# Patient Record
Sex: MALE | Race: WHITE | NOT HISPANIC OR LATINO | Employment: OTHER | URBAN - NONMETROPOLITAN AREA
[De-identification: names, ages, dates, MRNs, and addresses within clinical notes are randomized per-mention and may not be internally consistent; named-entity substitution may affect disease eponyms.]

---

## 2018-06-10 ENCOUNTER — HOSPITAL ENCOUNTER (INPATIENT)
Facility: HOSPITAL | Age: 83
LOS: 5 days | Discharge: NURSING, REHAB OR LONG-TERM CARE | End: 2018-06-15
Attending: FAMILY MEDICINE | Admitting: INTERNAL MEDICINE

## 2018-06-10 DIAGNOSIS — Z74.09 DECREASED STRENGTH, ENDURANCE, AND MOBILITY: ICD-10-CM

## 2018-06-10 DIAGNOSIS — R68.89 DECREASED STRENGTH, ENDURANCE, AND MOBILITY: ICD-10-CM

## 2018-06-10 DIAGNOSIS — R53.1 DECREASED STRENGTH, ENDURANCE, AND MOBILITY: ICD-10-CM

## 2018-06-10 DIAGNOSIS — Z74.09 IMPAIRED MOBILITY AND ADLS: ICD-10-CM

## 2018-06-10 DIAGNOSIS — R13.10 DYSPHAGIA, UNSPECIFIED TYPE: Primary | ICD-10-CM

## 2018-06-10 DIAGNOSIS — Z78.9 IMPAIRED MOBILITY AND ADLS: ICD-10-CM

## 2018-06-10 PROCEDURE — 25010000002 NALOXONE PER 1 MG

## 2018-06-10 RX ORDER — ACETAMINOPHEN 500 MG
500 TABLET ORAL EVERY 4 HOURS PRN
COMMUNITY

## 2018-06-10 RX ORDER — NALOXONE HYDROCHLORIDE 0.4 MG/ML
INJECTION, SOLUTION INTRAMUSCULAR; INTRAVENOUS; SUBCUTANEOUS
Status: COMPLETED
Start: 2018-06-10 | End: 2018-06-10

## 2018-06-10 RX ORDER — CLONIDINE HYDROCHLORIDE 0.1 MG/1
0.1 TABLET ORAL EVERY 4 HOURS PRN
COMMUNITY
End: 2018-06-15 | Stop reason: HOSPADM

## 2018-06-10 RX ORDER — CLONIDINE HYDROCHLORIDE 0.1 MG/1
0.1 TABLET ORAL NIGHTLY
COMMUNITY

## 2018-06-10 RX ORDER — AMMONIA INHALANTS 0.04 G/.3ML
1 INHALANT RESPIRATORY (INHALATION) ONCE
Status: COMPLETED | OUTPATIENT
Start: 2018-06-11 | End: 2018-06-10

## 2018-06-10 RX ORDER — METOPROLOL TARTRATE 50 MG/1
50 TABLET, FILM COATED ORAL 2 TIMES DAILY
COMMUNITY
End: 2018-06-15 | Stop reason: HOSPADM

## 2018-06-10 RX ORDER — LEVOTHYROXINE SODIUM 0.05 MG/1
50 TABLET ORAL DAILY
COMMUNITY

## 2018-06-10 RX ORDER — OXYBUTYNIN CHLORIDE 5 MG/1
5 TABLET ORAL DAILY
COMMUNITY
End: 2018-06-15 | Stop reason: HOSPADM

## 2018-06-10 RX ORDER — AMLODIPINE BESYLATE 5 MG/1
5 TABLET ORAL DAILY
COMMUNITY

## 2018-06-10 RX ORDER — ASPIRIN 81 MG/1
81 TABLET ORAL DAILY
COMMUNITY

## 2018-06-10 RX ORDER — TAMSULOSIN HYDROCHLORIDE 0.4 MG/1
1 CAPSULE ORAL NIGHTLY
COMMUNITY

## 2018-06-10 RX ORDER — TRAZODONE HYDROCHLORIDE 100 MG/1
100 TABLET ORAL NIGHTLY
COMMUNITY
End: 2018-06-15 | Stop reason: HOSPADM

## 2018-06-10 RX ORDER — OMEPRAZOLE 20 MG/1
20 CAPSULE, DELAYED RELEASE ORAL DAILY
COMMUNITY

## 2018-06-10 RX ORDER — DONEPEZIL HYDROCHLORIDE 10 MG/1
10 TABLET, FILM COATED ORAL NIGHTLY
COMMUNITY

## 2018-06-10 RX ORDER — VENLAFAXINE 37.5 MG/1
37.5 TABLET ORAL DAILY
COMMUNITY
End: 2018-06-15 | Stop reason: HOSPADM

## 2018-06-10 RX ORDER — RISPERIDONE 0.25 MG/1
0.25 TABLET ORAL 2 TIMES DAILY
COMMUNITY
End: 2018-06-15 | Stop reason: HOSPADM

## 2018-06-10 RX ADMIN — NALOXONE HYDROCHLORIDE 0.4 MG: 0.4 INJECTION, SOLUTION INTRAMUSCULAR; INTRAVENOUS; SUBCUTANEOUS at 23:44

## 2018-06-10 RX ADMIN — NALOXONE HYDROCHLORIDE 0.4 MG: 0.4 INJECTION, SOLUTION INTRAMUSCULAR; INTRAVENOUS; SUBCUTANEOUS at 23:40

## 2018-06-10 RX ADMIN — AMMONIA INHALANTS 1 EACH: 0.04 INHALANT RESPIRATORY (INHALATION) at 23:40

## 2018-06-11 ENCOUNTER — APPOINTMENT (OUTPATIENT)
Dept: NEUROLOGY | Facility: HOSPITAL | Age: 83
End: 2018-06-11
Attending: FAMILY MEDICINE

## 2018-06-11 ENCOUNTER — APPOINTMENT (OUTPATIENT)
Dept: CARDIOLOGY | Facility: HOSPITAL | Age: 83
End: 2018-06-11
Attending: FAMILY MEDICINE

## 2018-06-11 ENCOUNTER — APPOINTMENT (OUTPATIENT)
Dept: ULTRASOUND IMAGING | Facility: HOSPITAL | Age: 83
End: 2018-06-11

## 2018-06-11 PROBLEM — R41.89 UNRESPONSIVENESS: Status: ACTIVE | Noted: 2018-06-11

## 2018-06-11 LAB
ALBUMIN SERPL-MCNC: 3.5 G/DL (ref 3.5–5)
ALBUMIN/GLOB SERPL: 1.3 G/DL (ref 1.1–2.5)
ALP SERPL-CCNC: 72 U/L (ref 24–120)
ALT SERPL W P-5'-P-CCNC: 26 U/L (ref 0–54)
AMMONIA BLD-SCNC: <9 UMOL/L (ref 9–33)
ANION GAP SERPL CALCULATED.3IONS-SCNC: 8 MMOL/L (ref 4–13)
ARTERIAL PATENCY WRIST A: POSITIVE
AST SERPL-CCNC: 21 U/L (ref 7–45)
ATMOSPHERIC PRESS: 746 MMHG
BACTERIA UR QL AUTO: ABNORMAL /HPF
BASE EXCESS BLDA CALC-SCNC: 0.7 MMOL/L (ref 0–2)
BASOPHILS # BLD AUTO: 0.04 10*3/MM3 (ref 0–0.2)
BASOPHILS NFR BLD AUTO: 0.4 % (ref 0–2)
BDY SITE: ABNORMAL
BH CV ECHO MEAS - AO MAX PG: 8 MMHG
BH CV ECHO MEAS - AO ROOT AREA (BSA CORRECTED): 1.6
BH CV ECHO MEAS - AO ROOT AREA: 9.1 CM^2
BH CV ECHO MEAS - AO ROOT DIAM: 3.4 CM
BH CV ECHO MEAS - AO V2 MAX: 141 CM/SEC
BH CV ECHO MEAS - BSA(HAYCOCK): 2.1 M^2
BH CV ECHO MEAS - BSA: 2.1 M^2
BH CV ECHO MEAS - BZI_BMI: 26.3 KILOGRAMS/M^2
BH CV ECHO MEAS - BZI_METRIC_HEIGHT: 182.9 CM
BH CV ECHO MEAS - BZI_METRIC_WEIGHT: 88 KG
BH CV ECHO MEAS - CONTRAST EF 4CH: 50.8 ML/M^2
BH CV ECHO MEAS - EDV(CUBED): 33.1 ML
BH CV ECHO MEAS - EDV(MOD-SP4): 66.3 ML
BH CV ECHO MEAS - EDV(TEICH): 41.3 ML
BH CV ECHO MEAS - EF(MOD-SP4): 50.8 %
BH CV ECHO MEAS - ESV(MOD-SP4): 32.6 ML
BH CV ECHO MEAS - IVS/LVPW: 1.2
BH CV ECHO MEAS - LA DIMENSION: 3.9 CM
BH CV ECHO MEAS - LA/AO: 1.1
BH CV ECHO MEAS - LV DIASTOLIC VOL/BSA (35-75): 31.5 ML/M^2
BH CV ECHO MEAS - LV MASS(C)D: 233.5 GRAMS
BH CV ECHO MEAS - LV MASS(C)DI: 111 GRAMS/M^2
BH CV ECHO MEAS - LV SYSTOLIC VOL/BSA (12-30): 15.5 ML/M^2
BH CV ECHO MEAS - LVIDD: 3.2 CM
BH CV ECHO MEAS - LVLD AP4: 6.4 CM
BH CV ECHO MEAS - LVLS AP4: 6.1 CM
BH CV ECHO MEAS - LVOT AREA (M): 4.9 CM^2
BH CV ECHO MEAS - LVOT AREA: 4.9 CM^2
BH CV ECHO MEAS - LVOT DIAM: 2.5 CM
BH CV ECHO MEAS - MV A MAX VEL: 98 CM/SEC
BH CV ECHO MEAS - MV DEC TIME: 0.27 SEC
BH CV ECHO MEAS - MV E MAX VEL: 46.1 CM/SEC
BH CV ECHO MEAS - MV E/A: 0.47
BH CV ECHO MEAS - SI(MOD-SP4): 16 ML/M^2
BH CV ECHO MEAS - SV(MOD-SP4): 33.7 ML
BILIRUB SERPL-MCNC: 0.5 MG/DL (ref 0.1–1)
BILIRUB UR QL STRIP: NEGATIVE
BODY TEMPERATURE: 37 C
BUN BLD-MCNC: 16 MG/DL (ref 5–21)
BUN/CREAT SERPL: 17.2 (ref 7–25)
CA-I BLD-MCNC: 4.04 MG/DL (ref 4.6–5.4)
CALCIUM SPEC-SCNC: 8.5 MG/DL (ref 8.4–10.4)
CHLORIDE SERPL-SCNC: 94 MMOL/L (ref 98–110)
CK SERPL-CCNC: 39 U/L (ref 0–203)
CLARITY UR: CLEAR
CO2 SERPL-SCNC: 25 MMOL/L (ref 24–31)
COLOR UR: YELLOW
CREAT BLD-MCNC: 0.93 MG/DL (ref 0.5–1.4)
D-LACTATE SERPL-SCNC: 1.3 MMOL/L (ref 0.5–2)
DEPRECATED RDW RBC AUTO: 43.3 FL (ref 40–54)
EOSINOPHIL # BLD AUTO: 0.17 10*3/MM3 (ref 0–0.7)
EOSINOPHIL NFR BLD AUTO: 1.9 % (ref 0–4)
ERYTHROCYTE [DISTWIDTH] IN BLOOD BY AUTOMATED COUNT: 13.2 % (ref 12–15)
FOLATE SERPL-MCNC: 10.5 NG/ML
GFR SERPL CREATININE-BSD FRML MDRD: 77 ML/MIN/1.73
GLOBULIN UR ELPH-MCNC: 2.8 GM/DL
GLUCOSE BLD-MCNC: 105 MG/DL (ref 70–100)
GLUCOSE UR STRIP-MCNC: NEGATIVE MG/DL
HCO3 BLDA-SCNC: 24.2 MMOL/L (ref 20–26)
HCT VFR BLD AUTO: 39.8 % (ref 40–52)
HGB BLD-MCNC: 14.3 G/DL (ref 14–18)
HGB UR QL STRIP.AUTO: ABNORMAL
HOROWITZ INDEX BLD+IHG-RTO: 21 %
HYALINE CASTS UR QL AUTO: ABNORMAL /LPF
IMM GRANULOCYTES # BLD: 0.05 10*3/MM3 (ref 0–0.03)
IMM GRANULOCYTES NFR BLD: 0.6 % (ref 0–5)
INR PPP: 1.01 (ref 0.91–1.09)
KETONES UR QL STRIP: NEGATIVE
LEUKOCYTE ESTERASE UR QL STRIP.AUTO: ABNORMAL
LYMPHOCYTES # BLD AUTO: 1.11 10*3/MM3 (ref 0.72–4.86)
LYMPHOCYTES NFR BLD AUTO: 12.4 % (ref 15–45)
Lab: ABNORMAL
Lab: ABNORMAL
MAGNESIUM SERPL-MCNC: 1.9 MG/DL (ref 1.4–2.2)
MAXIMAL PREDICTED HEART RATE: 132 BPM
MCH RBC QN AUTO: 31.9 PG (ref 28–32)
MCHC RBC AUTO-ENTMCNC: 35.9 G/DL (ref 33–36)
MCV RBC AUTO: 88.8 FL (ref 82–95)
MODALITY: ABNORMAL
MONOCYTES # BLD AUTO: 0.72 10*3/MM3 (ref 0.19–1.3)
MONOCYTES NFR BLD AUTO: 8 % (ref 4–12)
NEUTROPHILS # BLD AUTO: 6.86 10*3/MM3 (ref 1.87–8.4)
NEUTROPHILS NFR BLD AUTO: 76.7 % (ref 39–78)
NITRITE UR QL STRIP: NEGATIVE
NRBC BLD MANUAL-RTO: 0 /100 WBC (ref 0–0)
OSMOLALITY SERPL: 270 MOSM/KG (ref 289–308)
OSMOLALITY UR: 278 MOSM/KG (ref 601–850)
PCO2 BLDA: 34.8 MM HG (ref 35–45)
PH BLDA: 7.45 PH UNITS (ref 7.35–7.45)
PH UR STRIP.AUTO: 7 [PH] (ref 5–8)
PHOSPHATE SERPL-MCNC: 4 MG/DL (ref 2.5–4.5)
PLATELET # BLD AUTO: 337 10*3/MM3 (ref 130–400)
PMV BLD AUTO: 9.3 FL (ref 6–12)
PO2 BLDA: 89.2 MM HG (ref 83–108)
POTASSIUM BLD-SCNC: 4.3 MMOL/L (ref 3.5–5.3)
PROCALCITONIN SERPL-MCNC: <0.25 NG/ML
PROT SERPL-MCNC: 6.3 G/DL (ref 6.3–8.7)
PROT UR QL STRIP: NEGATIVE
PROTHROMBIN TIME: 13.6 SECONDS (ref 11.9–14.6)
RBC # BLD AUTO: 4.48 10*6/MM3 (ref 4.8–5.9)
RBC # UR: ABNORMAL /HPF
REF LAB TEST METHOD: ABNORMAL
SAO2 % BLDCOA: 98 % (ref 94–99)
SODIUM BLD-SCNC: 127 MMOL/L (ref 135–145)
SODIUM UR-SCNC: 73 MMOL/L (ref 30–90)
SP GR UR STRIP: 1.01 (ref 1–1.03)
SQUAMOUS #/AREA URNS HPF: ABNORMAL /HPF
STRESS TARGET HR: 112 BPM
TROPONIN I SERPL-MCNC: <0.012 NG/ML (ref 0–0.03)
TSH SERPL DL<=0.05 MIU/L-ACNC: 2.4 MIU/ML (ref 0.47–4.68)
UROBILINOGEN UR QL STRIP: ABNORMAL
VENTILATOR MODE: ABNORMAL
WBC NRBC COR # BLD: 8.95 10*3/MM3 (ref 4.8–10.8)
WBC UR QL AUTO: ABNORMAL /HPF

## 2018-06-11 PROCEDURE — 95816 EEG AWAKE AND DROWSY: CPT

## 2018-06-11 PROCEDURE — 93306 TTE W/DOPPLER COMPLETE: CPT

## 2018-06-11 PROCEDURE — 25010000002 PERFLUTREN 6.52 MG/ML SUSPENSION: Performed by: INTERNAL MEDICINE

## 2018-06-11 PROCEDURE — 84425 ASSAY OF VITAMIN B-1: CPT | Performed by: PSYCHIATRY & NEUROLOGY

## 2018-06-11 PROCEDURE — 93306 TTE W/DOPPLER COMPLETE: CPT | Performed by: INTERNAL MEDICINE

## 2018-06-11 PROCEDURE — 93880 EXTRACRANIAL BILAT STUDY: CPT

## 2018-06-11 PROCEDURE — 82803 BLOOD GASES ANY COMBINATION: CPT

## 2018-06-11 PROCEDURE — 85610 PROTHROMBIN TIME: CPT | Performed by: FAMILY MEDICINE

## 2018-06-11 PROCEDURE — 84484 ASSAY OF TROPONIN QUANT: CPT | Performed by: FAMILY MEDICINE

## 2018-06-11 PROCEDURE — 99223 1ST HOSP IP/OBS HIGH 75: CPT | Performed by: PSYCHIATRY & NEUROLOGY

## 2018-06-11 PROCEDURE — 85025 COMPLETE CBC W/AUTO DIFF WBC: CPT | Performed by: FAMILY MEDICINE

## 2018-06-11 PROCEDURE — 94799 UNLISTED PULMONARY SVC/PX: CPT

## 2018-06-11 PROCEDURE — 83930 ASSAY OF BLOOD OSMOLALITY: CPT | Performed by: FAMILY MEDICINE

## 2018-06-11 PROCEDURE — 82330 ASSAY OF CALCIUM: CPT

## 2018-06-11 PROCEDURE — 84300 ASSAY OF URINE SODIUM: CPT | Performed by: FAMILY MEDICINE

## 2018-06-11 PROCEDURE — 83935 ASSAY OF URINE OSMOLALITY: CPT | Performed by: FAMILY MEDICINE

## 2018-06-11 PROCEDURE — 95816 EEG AWAKE AND DROWSY: CPT | Performed by: PSYCHIATRY & NEUROLOGY

## 2018-06-11 PROCEDURE — 82140 ASSAY OF AMMONIA: CPT | Performed by: PSYCHIATRY & NEUROLOGY

## 2018-06-11 PROCEDURE — 25010000002 CEFTRIAXONE PER 250 MG: Performed by: INTERNAL MEDICINE

## 2018-06-11 PROCEDURE — 83605 ASSAY OF LACTIC ACID: CPT | Performed by: INTERNAL MEDICINE

## 2018-06-11 PROCEDURE — 81001 URINALYSIS AUTO W/SCOPE: CPT | Performed by: FAMILY MEDICINE

## 2018-06-11 PROCEDURE — 82550 ASSAY OF CK (CPK): CPT | Performed by: FAMILY MEDICINE

## 2018-06-11 PROCEDURE — 25010000002 HEPARIN (PORCINE) PER 1000 UNITS: Performed by: FAMILY MEDICINE

## 2018-06-11 PROCEDURE — 82746 ASSAY OF FOLIC ACID SERUM: CPT | Performed by: PSYCHIATRY & NEUROLOGY

## 2018-06-11 PROCEDURE — 84443 ASSAY THYROID STIM HORMONE: CPT | Performed by: FAMILY MEDICINE

## 2018-06-11 PROCEDURE — 84145 PROCALCITONIN (PCT): CPT | Performed by: FAMILY MEDICINE

## 2018-06-11 PROCEDURE — 36600 WITHDRAWAL OF ARTERIAL BLOOD: CPT

## 2018-06-11 PROCEDURE — 94760 N-INVAS EAR/PLS OXIMETRY 1: CPT

## 2018-06-11 PROCEDURE — 80053 COMPREHEN METABOLIC PANEL: CPT | Performed by: FAMILY MEDICINE

## 2018-06-11 PROCEDURE — 84100 ASSAY OF PHOSPHORUS: CPT | Performed by: FAMILY MEDICINE

## 2018-06-11 PROCEDURE — 83735 ASSAY OF MAGNESIUM: CPT | Performed by: FAMILY MEDICINE

## 2018-06-11 RX ORDER — SODIUM CHLORIDE 9 MG/ML
50 INJECTION, SOLUTION INTRAVENOUS CONTINUOUS
Status: DISCONTINUED | OUTPATIENT
Start: 2018-06-11 | End: 2018-06-13

## 2018-06-11 RX ORDER — HEPARIN SODIUM 5000 [USP'U]/ML
5000 INJECTION, SOLUTION INTRAVENOUS; SUBCUTANEOUS EVERY 12 HOURS SCHEDULED
Status: DISCONTINUED | OUTPATIENT
Start: 2018-06-11 | End: 2018-06-15 | Stop reason: HOSPADM

## 2018-06-11 RX ORDER — FAMOTIDINE 20 MG/1
40 TABLET, FILM COATED ORAL DAILY
Status: DISCONTINUED | OUTPATIENT
Start: 2018-06-11 | End: 2018-06-15 | Stop reason: HOSPADM

## 2018-06-11 RX ORDER — SODIUM CHLORIDE 0.9 % (FLUSH) 0.9 %
1-10 SYRINGE (ML) INJECTION AS NEEDED
Status: DISCONTINUED | OUTPATIENT
Start: 2018-06-11 | End: 2018-06-15 | Stop reason: HOSPADM

## 2018-06-11 RX ORDER — HYDRALAZINE HYDROCHLORIDE 20 MG/ML
20 INJECTION INTRAMUSCULAR; INTRAVENOUS EVERY 6 HOURS PRN
Status: DISCONTINUED | OUTPATIENT
Start: 2018-06-11 | End: 2018-06-12

## 2018-06-11 RX ADMIN — PERFLUTREN 8.48 MG: 6.52 INJECTION, SUSPENSION INTRAVENOUS at 12:20

## 2018-06-11 RX ADMIN — SODIUM CHLORIDE 100 ML/HR: 9 INJECTION, SOLUTION INTRAVENOUS at 11:44

## 2018-06-11 RX ADMIN — HEPARIN SODIUM 5000 UNITS: 5000 INJECTION INTRAVENOUS; SUBCUTANEOUS at 22:08

## 2018-06-11 RX ADMIN — SODIUM CHLORIDE 100 ML/HR: 9 INJECTION, SOLUTION INTRAVENOUS at 22:15

## 2018-06-11 RX ADMIN — CEFTRIAXONE SODIUM 1 G: 1 INJECTION, POWDER, FOR SOLUTION INTRAMUSCULAR; INTRAVENOUS at 16:30

## 2018-06-11 RX ADMIN — HEPARIN SODIUM 5000 UNITS: 5000 INJECTION INTRAVENOUS; SUBCUTANEOUS at 01:36

## 2018-06-11 RX ADMIN — HEPARIN SODIUM 5000 UNITS: 5000 INJECTION INTRAVENOUS; SUBCUTANEOUS at 08:03

## 2018-06-11 RX ADMIN — SODIUM CHLORIDE 100 ML/HR: 9 INJECTION, SOLUTION INTRAVENOUS at 01:38

## 2018-06-11 NOTE — PROGRESS NOTES
Received consult that patient is from [facility]. SW called [facility] admissions office at [phone] and left a message for Tanner Ann. Will follow for return phone call re bed hold, etc.

## 2018-06-11 NOTE — PROGRESS NOTES
[facility] Medicine Services  INPATIENT PROGRESS NOTE    Length of Stay: 1  Date of Admission: 6/10/2018  Primary Care Physician: Titus Rubalcava II    Subjective   Chief Complaint:  Follow-up  HPI   Son is at bedside.  Patient has had previous episodes similar to this about 1 month ago that lasted 12hours.    He is times he had urinary tract infection.  He was transferred in our hospital for neurology consultation  Dr. Ayoub had seen the patient and requested further testing; he stopped risperidone and venlafaxine  Review of Systems   Unobtainable; patient is lethargic       Objective    Temp:  [97.2 °F (36.2 °C)-98.2 °F (36.8 °C)] 98.2 °F (36.8 °C)  Heart Rate:  [56-78] 64  Resp:  [14-18] 18  BP: (157-186)/(66-81) 157/66  Physical Exam  They will to respond even upon shaking him by his son  His pupils are surgical more pronounced on th eright  Normocephalic and atraumatic  No thyromegaly, no jvd  s1 s2  rrr no gallop, no gross murmur  soft obese abd, NABS, no obvious mass  No c/c/e  Warm dry skin  No gross skin lesions        Results Review:  I have reviewed the labs, radiology results, and diagnostic studies.    Laboratory Data:     Results from last 7 days  Lab Units 06/11/18  0544   WBC 10*3/mm3 8.95   HEMOGLOBIN g/dL 14.3   HEMATOCRIT % 39.8*   PLATELETS 10*3/mm3 337          Results from last 7 days  Lab Units 06/11/18  0544   SODIUM mmol/L 127*   POTASSIUM mmol/L 4.3   CHLORIDE mmol/L 94*   CO2 mmol/L 25.0   BUN mg/dL 16   CREATININE mg/dL 0.93   CALCIUM mg/dL 8.5   BILIRUBIN mg/dL 0.5   ALK PHOS U/L 72   ALT (SGPT) U/L 26   AST (SGOT) U/L 21   GLUCOSE mg/dL 105*       Culture Data:        Radiology Data:   Imaging Results (last 24 hours)     ** No results found for the last 24 hours. **          I have reviewed the patient current medications.     Assessment/Plan     Hospital Problem List     Unresponsiveness        Encephalopathy - ? Drug induced possible; Pyuria -  possibly UTI; possibly metabolic due to hyponatremia  Hx of depression  Hx of dementia  Hx of HTN        appreciate Neurology input  Monitor clinical status  Empric abx fo rpossibly uti  Prn IV antihtn vs clonidine patch  Bmp in AM     famotidine 40 mg Oral Daily   heparin (porcine) 5,000 Units Subcutaneous Q12H       Discharge Planning: to be determined    Richard Chisholm MD   06/11/18   2:28 PM

## 2018-06-11 NOTE — PROGRESS NOTES
Discharge Planning Assessment  [facility]     Patient Name: Cherelle Davis  MRN: [id number]  Today's Date: 6/11/2018    Admit Date: 6/10/2018          Discharge Needs Assessment     Row Name 06/11/18 1214       Living Environment    Lives With facility resident    Current Living Arrangements extended care facility   Skilled Nursing Facility - [facility] in [place], [place]    Primary Care Provided by other (see comments)   Staff at [facility]    Provides Primary Care For no one, unable/limited ability to care for self    Family Caregiver if Needed child(meghna), adult    Quality of Family Relationships helpful;involved;supportive       Resource/Environmental Concerns    Resource/Environmental Concerns none       Transition Planning    Patient/Family Anticipates Transition to long term care facility;inpatient rehabilitation facility    Patient/Family Anticipated Services at Transition skilled nursing       Discharge Needs Assessment    Readmission Within the Last 30 Days no previous admission in last 30 days    Concerns to be Addressed discharge planning    Equipment Currently Used at Home other (see comments)   All needed DME provided at [facility]    Outpatient/Agency/Support Group Needs skilled nursing facility    Discharge Coordination/Progress Patient is from [facility] in [place], [place]. Patient was at the skilled level of care per Tanner in admissions. Patient does not have a bed hold but can return if plan is for patient to continue restorative rehab. SW will follow for definite plans.        PARVEZ Newell

## 2018-06-11 NOTE — CONSULTS
Neurology Consult Note    Referring Provider: Dr. Ej Chisholm  Reason for Consultation: altered mental status      History of present illness:      This is an 88-year-old male.  The patient is a poor historian and most of this history is from his son who is in the room and assists in the history.  He tells me that his father was diagnosed with dementia in 2011.  Since that time he has been treated with symptomatic care.  He has had a constant decline over the last several months.  He was living in an assisted living facility.  On roughly May 19 of this year the patient had an atypical spell.  He had sudden full body jerking that was non-rhythmic in nature.  He was taken to [facility].  It was intermittent in its character.  He did not have tongue biting nor incontinence.  He did not lose consciousness and was able to speak to the entire duration of the event.  The duration was 12 hours and he returned to his baseline.  I do not have the records from this admission but the discharge medication list suggest that his main diagnosis at discharge was major neurocognitive disorder due to Alzheimer's disease, probable, with behavioral disturbance.  Medications added to his profile at that time included Aricept going from 5 mg to 10 mg daily at bedtime, Namenda going from 5 mg to 10 mg twice a day, risperidone 0.25 mg twice a day for behavioral disturbance, trazodone 50 mg daily at bedtime for sleep and depression, venlafaxine 37.5 mg daily added for depression and anxiety, and a discontinuation of Ambien.  He was discharged home and had a second spell a week or 2 after this that was similar.  He was readmitted to [facility] and discharged again to an assisted living facility.  Yesterday he was in a nursing facility and again had a spell that was short in its duration but subsequent to this was nonresponsive.  He was [facility] to [facility] for neurologic evaluation.   Reportedly he was nonresponsive but during nursing rounds this morning he all of a sudden set up and asked where he was and then laid back down in bed.  During my evaluation the room several times he purposely would open his eyes and look at me and his son talking and then would close his eyes back again.  By the time I examined him he was noncooperative and would not open his eyes.  His son tells me that when he visits his father he often does sleep the majority of the day.  His father is also prone to severe anxiety and does take medications for this and has for quite some time.      Past Medical History:   Diagnosis Date   • Anxiety    • BPH (benign prostatic hyperplasia)    • Dementia    • Depression    • GERD (gastroesophageal reflux disease)    • Hypertension    • Renal disease    • TIA (transient ischemic attack)        No Known Allergies  No current facility-administered medications on file prior to encounter.      No current outpatient prescriptions on file prior to encounter.       Social History     Social History   • Marital status:      Spouse name: N/A   • Number of children: N/A   • Years of education: N/A     Occupational History   • Not on file.     Social History Main Topics   • Smoking status: Never Smoker   • Smokeless tobacco: Never Used   • Alcohol use No   • Drug use: No   • Sexual activity: Not on file     Other Topics Concern   • Not on file     Social History Narrative   • No narrative on file     History reviewed. No pertinent family history.    Review of Systems  A 14 point review of systems was reviewed and was negative except for Altered mentation    Vital Signs   Temp:  [97.2 °F (36.2 °C)-98.2 °F (36.8 °C)] 98.2 °F (36.8 °C)  Heart Rate:  [56-78] 64  Resp:  [14-18] 18  BP: (157-186)/(66-81) 157/66    General Exam:  Head:  Normal cephalic, atraumatic  HEENT:  Neck supple  Fundoscopic Exam:  No signs of disc edema  CVS:  Regular rate and rhythm.  No murmurs  Carotid Examination:   No bruits  Lungs:  Clear to auscultation  Abdomen:  Non-tender, Non-distended  Extremities:  No signs of peripheral edema  Skin:  No rashes    Neurologic Exam:    Mental Status:    -The patient is nonresponsive to verbal stimulation.  When I try to move his arm or leg he withdraws them and put him back in his own position.  He opens eyes spontaneously when I have her I am speaking to his son but when I ask him to open his eyes he will not.  If I pry his eyes open there is no evidence of active nystagmus.  I attempted to  his hand and would hold above his face.  When I would drop his hand he would move away from his face without striking.    CN II:  Visual fields full.  Pupils equally reactive to light  CN III, IV, VI:  Extraocular Muscles full with no signs of nystagmus  CN V:  Facial sensory is symmetric with no asymmetries.  CN VII:  Facial motor symmetric  CN VIII:  Gross hearing intact bilaterally  CN IX:  Palate elevates symmetrically  CN X:  Palate elevates symmetrically  CN XI:  Shoulder shrug symmetric  CN XII:  Tongue is midline on protrusion    Motor:     Withdrawal from noxious stimulation in all 4 extremities    DTR:  -Right   Bicep: 2+ Tricep: 2+ Brachoradialis: 2+   Patella: 2+ Ankle: 2+ Neg Babinski  -Left   Bicep: 2+ Tricep: 2+ Brachoradialis: 2+   Patella: 2+ Ankle: 2+ Neg Babinski    Sensory:  -Withdrawal noted throughout    Coordination:  -No active tremors    Gait  -Nonambulatory      Results Review:  Lab Results (last 24 hours)     Procedure Component Value Units Date/Time    TSH [[id number]]  (Normal) Collected:  06/11/18 0544    Specimen:  Blood Updated:  06/11/18 0653     TSH 2.400 mIU/mL     Calcium, Ionized [[id number]]  (Abnormal) Collected:  06/11/18 0638    Specimen:  Blood Updated:  06/11/18 0645     Ionized Calcium 4.04 (L) mg/dL      Collected by [id number]    Procalcitonin [[id number]]  (Normal) Collected:  06/11/18 0544    Specimen:  Blood Updated:  06/11/18 0639     Procalcitonin  <0.25 ng/mL     Narrative:       SIRS, sepsis, severe sepsis, and septic shock are categorized according to the criteria of the consensus conference of the American College of Chest Physicians/Society of Critical Care Medicine.    PCT < 0.5 ng/mL     Systemic infection (sepsis) is not likely.    PCT >0.5 and < 2.0 ng/mL Systemic infection (sepsis) is possible, but other conditions are known to elevate PCT as well.    PCT > 2.0 ng/mL     Systemic infection (sepsis) is likely, unless other causes are known.      PCT > 10.0 ng/mL    Important systemic inflammatory response, almost exclusively due to severe bacterial sepsis or septic shock.    PCT values of < 0.5 ng/mL do not exclude an infection, because localized infections (without systemic signs) may be associated with such low concentrations, or a systemic infection in its initial stages (<6 hours).  Increased PCT can occur without infection.  PCT concentrations between 0.5 and 2.0 ng/mL should be interpreted taking into account the patients history.  It is recommended to retest PCT within 6-24 hours if any concentrations < 2.0 ng/mL are obtained.    Troponin [[id number]]  (Normal) Collected:  06/11/18 0544    Specimen:  Blood Updated:  06/11/18 0632     Troponin I <0.012 ng/mL     Comprehensive Metabolic Panel [[id number]]  (Abnormal) Collected:  06/11/18 0544    Specimen:  Blood Updated:  06/11/18 0625     Glucose 105 (H) mg/dL      BUN 16 mg/dL      Creatinine 0.93 mg/dL      Sodium 127 (L) mmol/L      Potassium 4.3 mmol/L      Chloride 94 (L) mmol/L      CO2 25.0 mmol/L      Calcium 8.5 mg/dL      Total Protein 6.3 g/dL      Albumin 3.50 g/dL      ALT (SGPT) 26 U/L      AST (SGOT) 21 U/L      Alkaline Phosphatase 72 U/L      Total Bilirubin 0.5 mg/dL      eGFR Non African Amer 77 mL/min/1.73      Globulin 2.8 gm/dL      A/G Ratio 1.3 g/dL      BUN/Creatinine Ratio 17.2     Anion Gap 8.0 mmol/L     Narrative:       The MDRD GFR formula is only valid for adults  with stable renal function between ages 18 and 70.    Magnesium [[id number]]  (Normal) Collected:  06/11/18 0544    Specimen:  Blood Updated:  06/11/18 0625     Magnesium 1.9 mg/dL     Phosphorus [[id number]]  (Normal) Collected:  06/11/18 0544    Specimen:  Blood Updated:  06/11/18 0625     Phosphorus 4.0 mg/dL     CK [[id number]]  (Normal) Collected:  06/11/18 0544    Specimen:  Blood Updated:  06/11/18 0625     Creatine Kinase 39 U/L     Protime-INR [[id number]]  (Normal) Collected:  06/11/18 0544    Specimen:  Blood Updated:  06/11/18 0619     Protime 13.6 Seconds      INR 1.01    CBC Auto Differential [[id number]]  (Abnormal) Collected:  06/11/18 0544    Specimen:  Blood Updated:  06/11/18 0610     WBC 8.95 10*3/mm3      RBC 4.48 (L) 10*6/mm3      Hemoglobin 14.3 g/dL      Hematocrit 39.8 (L) %      MCV 88.8 fL      MCH 31.9 pg      MCHC 35.9 g/dL      RDW 13.2 %      RDW-SD 43.3 fl      MPV 9.3 fL      Platelets 337 10*3/mm3      Neutrophil % 76.7 %      Lymphocyte % 12.4 (L) %      Monocyte % 8.0 %      Eosinophil % 1.9 %      Basophil % 0.4 %      Immature Grans % 0.6 %      Neutrophils, Absolute 6.86 10*3/mm3      Lymphocytes, Absolute 1.11 10*3/mm3      Monocytes, Absolute 0.72 10*3/mm3      Eosinophils, Absolute 0.17 10*3/mm3      Basophils, Absolute 0.04 10*3/mm3      Immature Grans, Absolute 0.05 (H) 10*3/mm3      nRBC 0.0 /100 WBC     Osmolality, Urine - Urine, Clean Catch [[id number]]  (Abnormal) Collected:  06/11/18 0450    Specimen:  Urine from Urine, Clean Catch Updated:  06/11/18 0542     Osmolality, Urine 278 (L) mOsm/kg     Sodium, Urine, Random - Urine, Clean Catch [[id number]]  (Normal) Collected:  06/11/18 0450    Specimen:  Urine from Urine, Clean Catch Updated:  06/11/18 0517     Sodium, Urine 73 mmol/L     Urinalysis, Microscopic Only - Urine, Clean Catch [[id number]]  (Abnormal) Collected:  06/11/18 0450    Specimen:  Urine from Urine, Clean Catch Updated:  06/11/18 0504     RBC, UA 6-12 (A) /HPF       WBC, UA 3-5 (A) /HPF      Bacteria, UA None Seen /HPF      Squamous Epithelial Cells, UA None Seen /HPF      Hyaline Casts, UA None Seen /LPF      Methodology Automated Microscopy    Urinalysis With / Culture If Indicated - Urine, Clean Catch [[id number]]  (Abnormal) Collected:  06/11/18 0450    Specimen:  Urine from Urine, Clean Catch Updated:  06/11/18 0504     Color, UA Yellow     Appearance, UA Clear     pH, UA 7.0     Specific Gravity, UA 1.008     Glucose, UA Negative     Ketones, UA Negative     Bilirubin, UA Negative     Blood, UA Small (1+) (A)     Protein, UA Negative     Leuk Esterase, UA Small (1+) (A)     Nitrite, UA Negative     Urobilinogen, UA 0.2 E.U./dL    Blood Gas, Arterial [[id number]]  (Abnormal) Collected:  06/11/18 0400    Specimen:  Arterial Blood Updated:  06/11/18 0414     Site Right Radial     Michael's Test Positive     pH, Arterial 7.451 (H) pH units      pCO2, Arterial 34.8 (L) mm Hg      pO2, Arterial 89.2 mm Hg      HCO3, Arterial 24.2 mmol/L      Base Excess, Arterial 0.7 mmol/L      O2 Saturation, Arterial 98.0 %      Temperature 37.0 C      Barometric Pressure for Blood Gas 746 mmHg      Modality Room Air     FIO2 21 %      Ventilator Mode NA     Collected by [id number]    Osmolality, Serum [[id number]]  (Abnormal) Collected:  06/11/18 0041    Specimen:  Blood Updated:  06/11/18 0155     Osmolality 270 (L) mOsm/kg           .  Imaging Results (last 24 hours)     ** No results found for the last 24 hours. **              CT of head without contrast reviewed by me from outside hospital.  There is evidence of severe atrophy in the bilateral temporal regions.  Otherwise there are no acute features.  It is worth mentioning that he has moderate to severe periventricular small vessel disease.    EEG reviewed by me.  No evidence of a platform activity.    Impression    1.  End-stage Alzheimer's disease based on history and neuro imaging  2.  Behavioral disturbance  3.  No signs of seizure  activity  4.  Hyponatremia    Discussion: Many of the features of his examination is functional in nature.  This was suggest some purposeful component to his current mental state.  He likely does have extremely severe Alzheimer's dementia based on the current description combined with his neuro imaging showing severe atrophy of the bilateral temporal lobes and specifically the medial portions.  His recent medication changes likely are playing some of an effect.  Specifically the risperidone and trazodone may be worsening his mental state.  I think it would be reasonable to discontinue these.  His EEG shows no signs of seizure activity.  His head CT shows no CNS structural lesions.  We do need to rule out reversible causes of a metabolic encephalopathy.  There are no signs on physical exam of increased tone to suggest neuroleptic malignant syndrome.  In addition to this he does not have hyperthermia or any other autonomic findings.    Plan    · B12  · Folate  · TSH - normal  · Ammonia  · B1  · Treatment of UTI by primary team  · Stop Risperidone  · Stop Venlafaxine    I discussed the patients findings and my recommendations with patient and family    Sheng Ayoub MD  06/11/18  1:49 PM

## 2018-06-11 NOTE — H&P
[facility] Medicine Services  HISTORY AND PHYSICAL    Date of Admission: 6/10/2018  Primary Care Physician: Titus Rubalcava II    Subjective     Chief Complaint: Unresponsiveness    History of Present Illness     88-year-old male with past medical history of anxiety, BPH, dementia, depression, GERD, hypertension, renal disease and TIA was seen at [facility] for unresponsiveness.  Patient is a nursing home resident.  As per nursing home employees patient's mental status been deteriorating for last couple weeks.  Patient has underlying dementia and he is on multiple Alzheimer's medication.  For last couple days patient was noted to be more confused and somewhat withdrawn.  Today patient was completely unresponsive so he was taken to [facility] for further evaluation.  Patient had a CT head which was negative for any acute findings.  During initial evaluation at [facility] patient was also noted to have some jerking movement.  Patient does not have any history of seizure or seizure-like activity.  Patient's lab was noted for hyponatremia otherwise within acceptable range.  Patient was transferred to our facility for further neurological evaluation.  During initial examination bedside patient is completely unresponsive.  Patient does not respond to any pain stimuli.     Patient had no response to ammonia salts and Narcan which is administered bedside after transferring to our facility.  Vital stable at the moment.    Review of Systems     Otherwise complete ROS reviewed and negative except as mentioned in the HPI.    Past Medical History:   Past Medical History:   Diagnosis Date   • Anxiety    • BPH (benign prostatic hyperplasia)    • Dementia    • Depression    • GERD (gastroesophageal reflux disease)    • Hypertension    • Renal disease    • TIA (transient ischemic attack)      Past Surgical History:  Past Surgical  History:   Procedure Laterality Date   • CATARACT EXTRACTION     • CHOLECYSTECTOMY       Social History:  reports that he has never smoked. He has never used smokeless tobacco. He reports that he does not drink alcohol or use drugs.    Family History: Family history of hypertension and diabetes    Allergies:  No Known Allergies  Medications:  Prior to Admission medications    Medication Sig Start Date End Date Taking? Authorizing Provider   acetaminophen (TYLENOL) 500 MG tablet Take 500 mg by mouth Every 4 (Four) Hours As Needed for Mild Pain .   Yes Historical Provider, MD   amLODIPine (NORVASC) 5 MG tablet Take 5 mg by mouth Daily.   Yes Historical Provider, MD   aspirin 81 MG EC tablet Take 81 mg by mouth Daily.   Yes Historical Provider, MD   CloNIDine (CATAPRES) 0.1 MG tablet Take 0.1 mg by mouth Every Night.   Yes Historical Provider, MD   CloNIDine (CATAPRES) 0.1 MG tablet Take 0.1 mg by mouth Every 4 (Four) Hours As Needed for High Blood Pressure (systolic >170).   Yes Historical Provider, MD   donepezil (ARICEPT) 10 MG tablet Take 10 mg by mouth Every Night.   Yes Historical Provider, MD   levothyroxine (SYNTHROID, LEVOTHROID) 50 MCG tablet Take 50 mcg by mouth Daily.   Yes Historical Provider, MD   MEMANTINE HCL PO Take 10 mg by mouth Daily.   Yes Historical Provider, MD   metoprolol tartrate (LOPRESSOR) 50 MG tablet Take 50 mg by mouth 2 (Two) Times a Day.   Yes Historical Provider, MD   omeprazole (priLOSEC) 20 MG capsule Take 20 mg by mouth Daily.   Yes Historical Provider, MD   oxybutynin (DITROPAN) 5 MG tablet Take 5 mg by mouth Daily.   Yes Historical Provider, MD   risperiDONE (risperDAL) 0.25 MG tablet Take 0.25 mg by mouth 2 (Two) Times a Day.   Yes Historical Provider, MD   tamsulosin (FLOMAX) 0.4 MG capsule 24 hr capsule Take 1 capsule by mouth Every Night.   Yes Historical Provider, MD   traZODone (DESYREL) 100 MG tablet Take 100 mg by mouth Every Night.   Yes Historical Provider, MD  "  venlafaxine (EFFEXOR) 37.5 MG tablet Take 37.5 mg by mouth Daily.   Yes Historical Provider, MD     Objective     Vital Signs: BP (!) 186/81 (BP Location: Right arm, Patient Position: Lying) Comment: nurse notified  Pulse 56   Temp 97.2 °F (36.2 °C) (Axillary)   Resp 16   Ht 182.9 cm (72\")   Wt 88.6 kg (195 lb 4.8 oz)   SpO2 95%   BMI 26.49 kg/m²   Physical Exam   Constitutional:   Completely unresponsive   HENT:   Head: Normocephalic and atraumatic.   Eyes: Pupils are equal, round, and reactive to light.   Pupils are constricted   Cardiovascular: Normal rate, regular rhythm and normal heart sounds.    Pulmonary/Chest: Effort normal and breath sounds normal.   Abdominal: Soft. Bowel sounds are normal.   Neurological:   Completely unresponsive  Unable to assess complete neurological exam due to unresponsiveness  1+ reflexes noted bilaterally upper and lower extremity  No Babinski sign  Pupillary constriction noted bilaterally  Patient did not respond to ammonia salts               Results Reviewed:  Lab Results (last 24 hours)     ** No results found for the last 24 hours. **        Imaging Results (last 24 hours)     ** No results found for the last 24 hours. **        I have personally reviewed and interpreted the radiology studies and ECG obtained at time of admission.     Assessment / Plan     Assessment:     1.  Unresponsiveness possibly secondary to underlying metabolic versus neurological etiology  2.  Hyponatremia  3.  Urinary retention  4.  Dementia  5.  Hypertension  6.  Rule out seizure activity    Plan:      -Admit to neurology floor  -Continue cardiac monitoring  -Continuous pulse ox  -EKG noted at [facility]  -Follow-up repeat EKG  -Follow-up carotid Doppler  -Follow-up echocardiogram  -CT head was negative initially  -Follow-up electrolytes  -Consider MRI brain in the a.m.-unable to assess if patient has any contraindication to MRI-reconfirm with primary care physician " before ordering MRI  -Continue IV fluid  -Follow-up urine sodium, urine osmolarity and serum osmolarity  -Keep Wen catheter  -Follow-up urinalysis and urine drug screen  -Hold sedating medications for now  -Hold all oral medications given underlying unresponsiveness  -Maintain optimal blood pressure  -Follow-up EEG  -Follow-up neurology consult  -Seizure precaution  -Keep nothing by mouth for now  -GI prophylaxis  -DVT prophylaxis            Code Status: DO NOT RESUSCITATE/DO NOT INTUBATE    I discussed the patients findings and my recommendations with the patient's RN    Estimated length of stay 3-4 days    Matthew Rangel MD   06/10/18   11:50 PM

## 2018-06-11 NOTE — PLAN OF CARE
Problem: Patient Care Overview  Goal: Plan of Care Review  Outcome: Ongoing (interventions implemented as appropriate)   06/11/18 3669   Coping/Psychosocial   Plan of Care Reviewed With patient   Plan of Care Review   Progress no change   OTHER   Outcome Summary PT transferred from [place], [facility]. Attempts made by nursing staff along with Dr Rangel with narcan, stermalrubs, and ammonia smelling salts without any changes. Eyes pinpoint pupils. FC placed for retention. Skin in tact.       Problem: Fall Risk (Adult)  Goal: Identify Related Risk Factors and Signs and Symptoms  Outcome: Outcome(s) achieved Date Met: 06/11/18    Goal: Absence of Fall  Outcome: Ongoing (interventions implemented as appropriate)      Problem: Skin Injury Risk (Adult)  Goal: Identify Related Risk Factors and Signs and Symptoms  Outcome: Outcome(s) achieved Date Met: 06/11/18    Goal: Skin Health and Integrity  Outcome: Ongoing (interventions implemented as appropriate)      Problem: Confusion, Acute (Adult)  Goal: Identify Related Risk Factors and Signs and Symptoms  Outcome: Outcome(s) achieved Date Met: 06/11/18

## 2018-06-12 LAB
AMPHET+METHAMPHET UR QL: NEGATIVE
ANION GAP SERPL CALCULATED.3IONS-SCNC: 11 MMOL/L (ref 4–13)
BARBITURATES UR QL SCN: NEGATIVE
BENZODIAZ UR QL SCN: NEGATIVE
BUN BLD-MCNC: 13 MG/DL (ref 5–21)
BUN/CREAT SERPL: 13.4 (ref 7–25)
CALCIUM SPEC-SCNC: 8.2 MG/DL (ref 8.4–10.4)
CANNABINOIDS SERPL QL: NEGATIVE
CHLORIDE SERPL-SCNC: 94 MMOL/L (ref 98–110)
CO2 SERPL-SCNC: 23 MMOL/L (ref 24–31)
COCAINE UR QL: NEGATIVE
CREAT BLD-MCNC: 0.97 MG/DL (ref 0.5–1.4)
GFR SERPL CREATININE-BSD FRML MDRD: 73 ML/MIN/1.73
GLUCOSE BLD-MCNC: 80 MG/DL (ref 70–100)
METHADONE UR QL SCN: NEGATIVE
OPIATES UR QL: NEGATIVE
PCP UR QL SCN: NEGATIVE
POTASSIUM BLD-SCNC: 3.9 MMOL/L (ref 3.5–5.3)
SODIUM BLD-SCNC: 128 MMOL/L (ref 135–145)
VIT B12 BLD-MCNC: 501 PG/ML (ref 239–931)

## 2018-06-12 PROCEDURE — 25010000002 CEFTRIAXONE PER 250 MG: Performed by: INTERNAL MEDICINE

## 2018-06-12 PROCEDURE — 80307 DRUG TEST PRSMV CHEM ANLYZR: CPT | Performed by: FAMILY MEDICINE

## 2018-06-12 PROCEDURE — 93010 ELECTROCARDIOGRAM REPORT: CPT | Performed by: INTERNAL MEDICINE

## 2018-06-12 PROCEDURE — 82607 VITAMIN B-12: CPT | Performed by: PSYCHIATRY & NEUROLOGY

## 2018-06-12 PROCEDURE — 99233 SBSQ HOSP IP/OBS HIGH 50: CPT | Performed by: PSYCHIATRY & NEUROLOGY

## 2018-06-12 PROCEDURE — 25010000002 HEPARIN (PORCINE) PER 1000 UNITS: Performed by: FAMILY MEDICINE

## 2018-06-12 PROCEDURE — 80048 BASIC METABOLIC PNL TOTAL CA: CPT | Performed by: INTERNAL MEDICINE

## 2018-06-12 PROCEDURE — 93005 ELECTROCARDIOGRAM TRACING: CPT | Performed by: INTERNAL MEDICINE

## 2018-06-12 PROCEDURE — 25010000002 HYDRALAZINE PER 20 MG: Performed by: INTERNAL MEDICINE

## 2018-06-12 RX ORDER — TOLVAPTAN 15 MG/1
7.5 TABLET ORAL ONCE
Status: COMPLETED | OUTPATIENT
Start: 2018-06-12 | End: 2018-06-12

## 2018-06-12 RX ORDER — AMLODIPINE BESYLATE 5 MG/1
5 TABLET ORAL
Status: DISCONTINUED | OUTPATIENT
Start: 2018-06-12 | End: 2018-06-12

## 2018-06-12 RX ORDER — CARVEDILOL 3.12 MG/1
3.12 TABLET ORAL EVERY 12 HOURS SCHEDULED
Status: DISCONTINUED | OUTPATIENT
Start: 2018-06-12 | End: 2018-06-15 | Stop reason: HOSPADM

## 2018-06-12 RX ORDER — DONEPEZIL HYDROCHLORIDE 10 MG/1
10 TABLET, FILM COATED ORAL NIGHTLY
Status: DISCONTINUED | OUTPATIENT
Start: 2018-06-12 | End: 2018-06-15 | Stop reason: HOSPADM

## 2018-06-12 RX ORDER — MEMANTINE HYDROCHLORIDE 5 MG/1
10 TABLET ORAL DAILY
Status: DISCONTINUED | OUTPATIENT
Start: 2018-06-12 | End: 2018-06-15 | Stop reason: HOSPADM

## 2018-06-12 RX ADMIN — CEFTRIAXONE SODIUM 1 G: 1 INJECTION, POWDER, FOR SOLUTION INTRAMUSCULAR; INTRAVENOUS at 17:30

## 2018-06-12 RX ADMIN — HEPARIN SODIUM 5000 UNITS: 5000 INJECTION INTRAVENOUS; SUBCUTANEOUS at 21:17

## 2018-06-12 RX ADMIN — SODIUM CHLORIDE 100 ML/HR: 9 INJECTION, SOLUTION INTRAVENOUS at 06:44

## 2018-06-12 RX ADMIN — SODIUM CHLORIDE 50 ML/HR: 9 INJECTION, SOLUTION INTRAVENOUS at 18:00

## 2018-06-12 RX ADMIN — HEPARIN SODIUM 5000 UNITS: 5000 INJECTION INTRAVENOUS; SUBCUTANEOUS at 08:28

## 2018-06-12 RX ADMIN — DONEPEZIL HYDROCHLORIDE 10 MG: 10 TABLET, FILM COATED ORAL at 21:17

## 2018-06-12 RX ADMIN — MEMANTINE HYDROCHLORIDE 5 MG: 5 TABLET, FILM COATED ORAL at 18:01

## 2018-06-12 RX ADMIN — Medication 20 MG: at 05:46

## 2018-06-12 RX ADMIN — TOLVAPTAN 7.5 MG: 15 TABLET ORAL at 17:30

## 2018-06-12 RX ADMIN — MEMANTINE HYDROCHLORIDE 5 MG: 5 TABLET, FILM COATED ORAL at 17:31

## 2018-06-12 RX ADMIN — CARVEDILOL 3.12 MG: 3.12 TABLET, FILM COATED ORAL at 21:17

## 2018-06-12 NOTE — PLAN OF CARE
Problem: Patient Care Overview  Goal: Plan of Care Review  Outcome: Ongoing (interventions implemented as appropriate)   06/12/18 0428   Coping/Psychosocial   Plan of Care Reviewed With patient   Plan of Care Review   Progress no change   OTHER   Outcome Summary vss; alert and oriented to self; very [place]; arouses to voice; answered some yes and no questions; carranza in place for retention; continue to monitor     Goal: Individualization and Mutuality  Outcome: Ongoing (interventions implemented as appropriate)    Goal: Discharge Needs Assessment  Outcome: Ongoing (interventions implemented as appropriate)    Goal: Interprofessional Rounds/Family Conf  Outcome: Ongoing (interventions implemented as appropriate)      Problem: Fall Risk (Adult)  Goal: Absence of Fall  Outcome: Ongoing (interventions implemented as appropriate)      Problem: Skin Injury Risk (Adult)  Goal: Skin Health and Integrity  Outcome: Ongoing (interventions implemented as appropriate)      Problem: Confusion, Acute (Adult)  Goal: Cognitive/Functional Impairments Minimized  Outcome: Ongoing (interventions implemented as appropriate)    Goal: Safety  Outcome: Ongoing (interventions implemented as appropriate)

## 2018-06-12 NOTE — PROGRESS NOTES
[facility] Medicine Services  INPATIENT PROGRESS NOTE    Length of Stay: 2  Date of Admission: 6/10/2018  Primary Care Physician: Titus Rubalcava II    Subjective   Chief Complaint: follow up     HPI   He is more awake than yesterday.  Dr. Ayoub felt he has end stage Alzheimer based on hx and neuroimaging   Afebrile        Review of Systems     Hard of hearing  Unable to carry out meaningful conversation; slow to respond    Objective    Temp:  [98 °F (36.7 °C)-98.9 °F (37.2 °C)] 98.2 °F (36.8 °C)  Heart Rate:  [65-89] 65  Resp:  [16-20] 16  BP: ()/(61-85) 93/61  Physical Exam  He is spontaneously opens his eyes and has movement of extremities,  He is verbal but very minimal.  Unable to carry out conversation but answers slowly but appropriately simple questions   Normocephalic and atraumatic  No thyromegaly, no jvd  s1 s2  rrr no gallop, no gross murmur  soft obese abd, NABS, no obvious mass  No c/c/e  Warm dry skin  No gross skin lesions      Results Review:  I have reviewed the labs, radiology results, and diagnostic studies.    Laboratory Data:     Results from last 7 days  Lab Units 06/11/18  0544   WBC 10*3/mm3 8.95   HEMOGLOBIN g/dL 14.3   HEMATOCRIT % 39.8*   PLATELETS 10*3/mm3 337          Results from last 7 days  Lab Units 06/12/18  0519 06/11/18  0544   SODIUM mmol/L 128* 127*   POTASSIUM mmol/L 3.9 4.3   CHLORIDE mmol/L 94* 94*   CO2 mmol/L 23.0* 25.0   BUN mg/dL 13 16   CREATININE mg/dL 0.97 0.93   CALCIUM mg/dL 8.2* 8.5   BILIRUBIN mg/dL  --  0.5   ALK PHOS U/L  --  72   ALT (SGPT) U/L  --  26   AST (SGOT) U/L  --  21   GLUCOSE mg/dL 80 105*       Culture Data:        Radiology Data:   Imaging Results (last 24 hours)     Procedure Component Value Units Date/Time    US Carotid Bilateral [[id number]] Updated:  06/11/18 1630          I have reviewed the patient current medications.     Assessment/Plan     Hospital Problem List     Unresponsiveness         Encephalopathy - ? Drug induced possible; Pyuria - possibly UTI; possibly metabolic due to hyponatremia  Hx of depression  Hx of dementia (end stage alzheimer)  Hx of HTN - start on carvedilol;  Tachycardia -  ekg showed stach at 117  With 1st deg av block and  and occ PVC    Plans  Resume donepezil and namenda  Bedside swallow test c/o nurse; if failed refer to st for further eval; if passed - start on pureed diet with honey thickened liquid and meds in apple sauce.  Aspiration precaution  samsca 7.5 mg x 1 dose; follow electrolytes and volume status; cut back IVF  To 50 ml /hr  dnr aggressive; no artificial per family  Other plan per orders  Will refer to PT/OT  Sw to facilitate SNF at [place] per family's request  Discussed with nurse Rd and sw            Discharge Planning: ? snf in couple of days possibly    Richard Chisholm MD   06/12/18   2:46 PM

## 2018-06-12 NOTE — DISCHARGE PLACEMENT REQUEST
"Call Back: PARVEZ Hilton [phone]  TO: Ynes Jones   Phone: [phone]  Fax: [phone]  Attn: Ilene MedranoRyan, Ewing  (88 y.o. Male)     Date of Birth Social Security Number Address Home Phone MRN    06/14/1929  [address]  [place] [address] [phone] [id number]    Amish Marital Status          [facility] Emergency Department        Admission Date Admission Type Admitting Provider Attending Provider Department, Room/Bed    6/10/18 Urgent Richard Chisholm MD [place], Glenn Riego, MD [facility] 3A, 342/1    Discharge Date Discharge Disposition Discharge Destination                       Attending Provider:  Richard Chisholm MD    Allergies:  No Known Allergies    Isolation:  None   Infection:  None   Code Status:  Conditional    Ht:  182.9 cm (72\")   Wt:  88.6 kg (195 lb 4.8 oz)    Admission Cmt:  None   Principal Problem:  None                Active Insurance as of 6/10/2018     Primary Coverage     Payor Plan Insurance Group Employer/Plan Group    MEDICARE MEDICARE A & B      Payor Plan Address Payor Plan Phone Number Effective From Effective To    [address] [phone] 6/1/1994     [facility] [address]       Subscriber Name Subscriber Birth Date Member ID       CHERELLE CARO 6/14/1929 [id number]           Secondary Coverage     Payor Plan Insurance Group Employer/Plan Group    [facility] SUPP [id number]     Payor Plan Address Payor Plan Phone Number Effective From Effective To    [address]  12/1/2016     [facility] [id number]       Subscriber Name Subscriber Birth Date Member ID       CHERELLE CARO 6/14/1929 [id number]                 Emergency Contacts      (Rel.) Home Phone Work Phone Mobile Phone    RyanRd (Power of ) -- -- [phone]    Pebbles Jade (Daughter) -- -- [phone]            Insurance Information                MEDICARE/MEDICARE A & B Phone: [phone]    " Subscriber: Cherelle Davis Subscriber#: [id number]    Group#:  Precert#:         ELODIA BLUE CROSS/ELODIA [facility] SUPP Phone:     Subscriber: Cherelle Davis Subscriber#: [id number]    Group#: [id number] Precert#:              History & Physical      Matthew Rangel MD at 6/10/2018 11:50 PM              [facility] Medicine Services  HISTORY AND PHYSICAL    Date of Admission: 6/10/2018  Primary Care Physician: Titus Rubalcava II    Subjective     Chief Complaint: Unresponsiveness    History of Present Illness     88-year-old male with past medical history of anxiety, BPH, dementia, depression, GERD, hypertension, renal disease and TIA was seen at [facility] for unresponsiveness.  Patient is a nursing home resident.  As per nursing home employees patient's mental status been deteriorating for last couple weeks.  Patient has underlying dementia and he is on multiple Alzheimer's medication.  For last couple days patient was noted to be more confused and somewhat withdrawn.  Today patient was completely unresponsive so he was taken to [facility] for further evaluation.  Patient had a CT head which was negative for any acute findings.  During initial evaluation at [facility] patient was also noted to have some jerking movement.  Patient does not have any history of seizure or seizure-like activity.  Patient's lab was noted for hyponatremia otherwise within acceptable range.  Patient was transferred to our facility for further neurological evaluation.  During initial examination bedside patient is completely unresponsive.  Patient does not respond to any pain stimuli.     Patient had no response to ammonia salts and Narcan which is administered bedside after transferring to our facility.  Vital stable at the moment.    Review of Systems     Otherwise complete ROS reviewed and negative except as mentioned in the  HPI.    Past Medical History:   Past Medical History:   Diagnosis Date   • Anxiety    • BPH (benign prostatic hyperplasia)    • Dementia    • Depression    • GERD (gastroesophageal reflux disease)    • Hypertension    • Renal disease    • TIA (transient ischemic attack)      Past Surgical History:  Past Surgical History:   Procedure Laterality Date   • CATARACT EXTRACTION     • CHOLECYSTECTOMY       Social History:  reports that he has never smoked. He has never used smokeless tobacco. He reports that he does not drink alcohol or use drugs.    Family History: Family history of hypertension and diabetes    Allergies:  No Known Allergies  Medications:  Prior to Admission medications    Medication Sig Start Date End Date Taking? Authorizing Provider   acetaminophen (TYLENOL) 500 MG tablet Take 500 mg by mouth Every 4 (Four) Hours As Needed for Mild Pain .   Yes Historical Provider, MD   amLODIPine (NORVASC) 5 MG tablet Take 5 mg by mouth Daily.   Yes Historical Provider, MD   aspirin 81 MG EC tablet Take 81 mg by mouth Daily.   Yes Historical Provider, MD   CloNIDine (CATAPRES) 0.1 MG tablet Take 0.1 mg by mouth Every Night.   Yes Historical Provider, MD   CloNIDine (CATAPRES) 0.1 MG tablet Take 0.1 mg by mouth Every 4 (Four) Hours As Needed for High Blood Pressure (systolic >170).   Yes Historical Provider, MD   donepezil (ARICEPT) 10 MG tablet Take 10 mg by mouth Every Night.   Yes Historical Provider, MD   levothyroxine (SYNTHROID, LEVOTHROID) 50 MCG tablet Take 50 mcg by mouth Daily.   Yes Historical Provider, MD   MEMANTINE HCL PO Take 10 mg by mouth Daily.   Yes Historical Provider, MD   metoprolol tartrate (LOPRESSOR) 50 MG tablet Take 50 mg by mouth 2 (Two) Times a Day.   Yes Historical Provider, MD   omeprazole (priLOSEC) 20 MG capsule Take 20 mg by mouth Daily.   Yes Historical Provider, MD   oxybutynin (DITROPAN) 5 MG tablet Take 5 mg by mouth Daily.   Yes Historical Provider, MD   risperiDONE (risperDAL)  "0.25 MG tablet Take 0.25 mg by mouth 2 (Two) Times a Day.   Yes Historical Provider, MD   tamsulosin (FLOMAX) 0.4 MG capsule 24 hr capsule Take 1 capsule by mouth Every Night.   Yes Historical Provider, MD   traZODone (DESYREL) 100 MG tablet Take 100 mg by mouth Every Night.   Yes Historical Provider, MD   venlafaxine (EFFEXOR) 37.5 MG tablet Take 37.5 mg by mouth Daily.   Yes Historical Provider, MD     Objective     Vital Signs: BP (!) 186/81 (BP Location: Right arm, Patient Position: Lying) Comment: nurse notified  Pulse 56   Temp 97.2 °F (36.2 °C) (Axillary)   Resp 16   Ht 182.9 cm (72\")   Wt 88.6 kg (195 lb 4.8 oz)   SpO2 95%   BMI 26.49 kg/m²    Physical Exam   Constitutional:   Completely unresponsive   HENT:   Head: Normocephalic and atraumatic.   Eyes: Pupils are equal, round, and reactive to light.   Pupils are constricted   Cardiovascular: Normal rate, regular rhythm and normal heart sounds.    Pulmonary/Chest: Effort normal and breath sounds normal.   Abdominal: Soft. Bowel sounds are normal.   Neurological:   Completely unresponsive  Unable to assess complete neurological exam due to unresponsiveness  1+ reflexes noted bilaterally upper and lower extremity  No Babinski sign  Pupillary constriction noted bilaterally  Patient did not respond to ammonia salts               Results Reviewed:  Lab Results (last 24 hours)     ** No results found for the last 24 hours. **        Imaging Results (last 24 hours)     ** No results found for the last 24 hours. **        I have personally reviewed and interpreted the radiology studies and ECG obtained at time of admission.     Assessment / Plan     Assessment:     1.  Unresponsiveness possibly secondary to underlying metabolic versus neurological etiology  2.  Hyponatremia  3.  Urinary retention  4.  Dementia  5.  Hypertension  6.  Rule out seizure activity    Plan:      -Admit to neurology floor  -Continue cardiac monitoring  -Continuous pulse ox  -EKG " noted at [facility]  -Follow-up repeat EKG  -Follow-up carotid Doppler  -Follow-up echocardiogram  -CT head was negative initially  -Follow-up electrolytes  -Consider MRI brain in the a.m.-unable to assess if patient has any contraindication to MRI-reconfirm with primary care physician before ordering MRI  -Continue IV fluid  -Follow-up urine sodium, urine osmolarity and serum osmolarity  -Keep Wen catheter  -Follow-up urinalysis and urine drug screen  -Hold sedating medications for now  -Hold all oral medications given underlying unresponsiveness  -Maintain optimal blood pressure  -Follow-up EEG  -Follow-up neurology consult  -Seizure precaution  -Keep nothing by mouth for now  -GI prophylaxis  -DVT prophylaxis            Code Status: DO NOT RESUSCITATE/DO NOT INTUBATE    I discussed the patients findings and my recommendations with the patient's RN    Estimated length of stay 3-4 days    Matthew Rangel MD   06/10/18   11:50 PM              Electronically signed by Matthew Rangel MD at 6/11/2018 12:12 AM             ICU Vital Signs     Row Name 06/12/18 1135 06/12/18 0800 06/12/18 0715 06/12/18 0546 06/12/18 0500       Vitals    Temp 98.2 °F (36.8 °C)  -- 98 °F (36.7 °C)  --  --    Temp src Axillary  -- Axillary  --  --    Pulse 65  -- 89  --  --    Heart Rate Source Monitor  -- Monitor  --  --    Resp 16  -- 16  --  --    Resp Rate Source Visual  -- Visual  --  --    BP 93/61  -- 121/62 170/85 --   Oai RN notified    BP Location Left arm  -- Left arm  --  --    BP Method Automatic  -- Automatic  --  --    Patient Position Lying  -- Lying  --  --       Oxygen Therapy    SpO2 96 %  -- 94 %  --  --    Pulse Oximetry Type  --  -- Continuous  --  --    Device (Oxygen Therapy) room air room air room air  --  --    Row Name 06/12/18 0400 06/12/18 0000 06/11/18 2100 06/11/18 2007 06/11/18 1553       Vitals    Temp 98.9 °F (37.2 °C) 98.7 °F (37.1 °C)  -- 98 °F (36.7 °C) 98.4 °F (36.9 °C)     Temp src Oral Oral  -- Axillary Axillary    Pulse 85 82  -- 65 68    Heart Rate Source Monitor Monitor  -- Monitor Monitor    Resp 18 20  -- 18 20    Resp Rate Source Visual Visual  -- Visual Visual    /85 162/71  -- 168/77   notified nurse 154/77   notified nurse    BP Location Left arm Right arm  -- Right arm Right arm    BP Method Automatic Automatic  -- Automatic Automatic    Patient Position Lying Lying  -- Lying Lying       Oxygen Therapy    SpO2 94 % 96 %  -- 96 % 95 %    Pulse Oximetry Type Continuous Continuous  -- Continuous Continuous    Device (Oxygen Therapy) room air room air room air room air room air        Prior to Admission Medications     Prescriptions Last Dose Informant Patient Reported? Taking?    acetaminophen (TYLENOL) 500 MG tablet   Yes Yes    Take 500 mg by mouth Every 4 (Four) Hours As Needed for Mild Pain .    amLODIPine (NORVASC) 5 MG tablet   Yes Yes    Take 5 mg by mouth Daily.    aspirin 81 MG EC tablet   Yes Yes    Take 81 mg by mouth Daily.    CloNIDine (CATAPRES) 0.1 MG tablet   Yes Yes    Take 0.1 mg by mouth Every Night.    CloNIDine (CATAPRES) 0.1 MG tablet   Yes Yes    Take 0.1 mg by mouth Every 4 (Four) Hours As Needed for High Blood Pressure (systolic >170).    donepezil (ARICEPT) 10 MG tablet   Yes Yes    Take 10 mg by mouth Every Night.    levothyroxine (SYNTHROID, LEVOTHROID) 50 MCG tablet   Yes Yes    Take 50 mcg by mouth Daily.    MEMANTINE HCL PO   Yes Yes    Take 10 mg by mouth Daily.    metoprolol tartrate (LOPRESSOR) 50 MG tablet   Yes Yes    Take 50 mg by mouth 2 (Two) Times a Day.    omeprazole (priLOSEC) 20 MG capsule   Yes Yes    Take 20 mg by mouth Daily.    oxybutynin (DITROPAN) 5 MG tablet   Yes Yes    Take 5 mg by mouth Daily.    risperiDONE (risperDAL) 0.25 MG tablet   Yes Yes    Take 0.25 mg by mouth 2 (Two) Times a Day.    tamsulosin (FLOMAX) 0.4 MG capsule 24 hr capsule   Yes Yes    Take 1 capsule by mouth Every Night.    traZODone (DESYREL) 100 MG  tablet   Yes Yes    Take 100 mg by mouth Every Night.    venlafaxine (EFFEXOR) 37.5 MG tablet   Yes Yes    Take 37.5 mg by mouth Daily.          Hospital Medications (active)       Dose Frequency Start End    carvedilol (COREG) tablet 3.125 mg 3.125 mg Every 12 Hours Scheduled 6/12/2018     Sig - Route: Take 1 tablet by mouth Every 12 (Twelve) Hours. - Oral    cefTRIAXone (ROCEPHIN) 1 g/10mL IV PUSH syringe 1 g Every 24 Hours 6/11/2018 6/14/2018    Sig - Route: Infuse 10 mL into a venous catheter Daily. - Intravenous    donepezil (ARICEPT) tablet 10 mg 10 mg Nightly 6/12/2018     Sig - Route: Take 1 tablet by mouth Every Night. - Oral    famotidine (PEPCID) tablet 40 mg 40 mg Daily 6/11/2018     Sig - Route: Take 2 tablets by mouth Daily. - Oral    heparin (porcine) 5000 UNIT/ML injection 5,000 Units 5,000 Units Every 12 Hours Scheduled 6/11/2018     Sig - Route: Inject 1 mL under the skin Every 12 (Twelve) Hours. - Subcutaneous    memantine (NAMENDA) tablet 10 mg 10 mg Daily 6/12/2018     Sig - Route: Take 2 tablets by mouth Daily. - Oral    sodium chloride 0.9 % flush 1-10 mL 1-10 mL As Needed 6/11/2018     Sig - Route: Infuse 1-10 mL into a venous catheter As Needed for Line Care. - Intravenous    sodium chloride 0.9 % infusion 50 mL/hr Continuous 6/11/2018     Sig - Route: Infuse 50 mL/hr into a venous catheter Continuous. - Intravenous    tolvaptan (SAMSCA) tablet 7.5 mg 7.5 mg Once 6/12/2018     Sig - Route: Take 0.5 tablets by mouth 1 (One) Time. - Oral    amLODIPine (NORVASC) tablet 5 mg (Discontinued) 5 mg Every 24 Hours Scheduled 6/12/2018 6/12/2018    Sig - Route: Take 1 tablet by mouth Daily. - Oral    hydrALAZINE (APRESOLINE) injection 20 mg (Discontinued) 20 mg Every 6 Hours PRN 6/11/2018 6/12/2018    Sig - Route: Infuse 1 mL into a venous catheter Every 6 (Six) Hours As Needed for High Blood Pressure. - Intravenous             Physician Progress Notes (last 72 hours) (Notes from 6/9/2018  3:16 PM  through 6/12/2018  3:16 PM)      Richard Chisholm MD at 6/12/2018  2:46 PM              [facility] Medicine Services  INPATIENT PROGRESS NOTE    Length of Stay: 2  Date of Admission: 6/10/2018  Primary Care Physician: Titus Rubalcvaa II    Subjective   Chief Complaint: follow up     HPI   He is more awake than yesterday.  Dr. Ayoub felt he has end stage Alzheimer based on hx and neuroimaging   Afebrile        Review of Systems     Hard of hearing  Unable to carry out meaningful conversation; slow to respond    Objective    Temp:  [98 °F (36.7 °C)-98.9 °F (37.2 °C)] 98.2 °F (36.8 °C)  Heart Rate:  [65-89] 65  Resp:  [16-20] 16  BP: ()/(61-85) 93/61  Physical Exam  He is spontaneously opens his eyes and has movement of extremities,  He is verbal but very minimal.  Unable to carry out conversation but answers slowly but appropriately simple questions   Normocephalic and atraumatic  No thyromegaly, no jvd  s1 s2  rrr no gallop, no gross murmur  soft obese abd, NABS, no obvious mass  No c/c/e  Warm dry skin  No gross skin lesions      Results Review:  I have reviewed the labs, radiology results, and diagnostic studies.    Laboratory Data:     Results from last 7 days  Lab Units 06/11/18  0544   WBC 10*3/mm3 8.95   HEMOGLOBIN g/dL 14.3   HEMATOCRIT % 39.8*   PLATELETS 10*3/mm3 337          Results from last 7 days  Lab Units 06/12/18  0519 06/11/18  0544   SODIUM mmol/L 128* 127*   POTASSIUM mmol/L 3.9 4.3   CHLORIDE mmol/L 94* 94*   CO2 mmol/L 23.0* 25.0   BUN mg/dL 13 16   CREATININE mg/dL 0.97 0.93   CALCIUM mg/dL 8.2* 8.5   BILIRUBIN mg/dL  --  0.5   ALK PHOS U/L  --  72   ALT (SGPT) U/L  --  26   AST (SGOT) U/L  --  21   GLUCOSE mg/dL 80 105*       Culture Data:        Radiology Data:   Imaging Results (last 24 hours)     Procedure Component Value Units Date/Time    US Carotid Bilateral [[id number]] Updated:  06/11/18 1637          I have reviewed the patient current  "medications.     Assessment/Plan     Hospital Problem List     Unresponsiveness        Encephalopathy - ? Drug induced possible; Pyuria - possibly UTI; possibly metabolic due to hyponatremia  Hx of depression  Hx of dementia (end stage alzheimer)  Hx of HTN - start on carvedilol;  Tachycardia -  ekg showed stach at 117  With 1st deg av block and  and occ PVC    Plans  Resume donepezil and namenda  Bedside swallow test c/o nurse; if failed refer to st for further eval; if passed - start on pureed diet with honey thickened liquid and meds in apple sauce.  Aspiration precaution  samsca 7.5 mg x 1 dose; follow electrolytes and volume status; cut back IVF  To 50 ml /hr  dnr aggressive; no artificial per family  Other plan per orders  Will refer to PT/OT  Sw to facilitate SNF at [place] per family's request  Discussed with nurse Rd and sw            Discharge Planning: ? snf in couple of days possibly    Richard Chisholm MD   06/12/18   2:46 PM      Electronically signed by Richrad Chisholm MD at 6/12/2018  3:05 PM     Sheng Ayoub MD at 6/12/2018 11:42 AM            Neurology Progress Note      Chief Complaint:  Altered mental status    Subjective     Subjective:    Somewhat more awake today and tries to answer questions.  Does not follow commands.  Often says \"what\" and then repeats question.    Medications:  Current Facility-Administered Medications   Medication Dose Route Frequency Provider Last Rate Last Dose   • cefTRIAXone (ROCEPHIN) 1 g/10mL IV PUSH syringe  1 g Intravenous Q24H Richard Chisholm MD   1 g at 06/11/18 1630   • famotidine (PEPCID) tablet 40 mg  40 mg Oral Daily Matthew Rangel MD       • heparin (porcine) 5000 UNIT/ML injection 5,000 Units  5,000 Units Subcutaneous Q12H Matthew Rangel MD   5,000 Units at 06/12/18 0828   • hydrALAZINE (APRESOLINE) injection 20 mg  20 mg Intravenous Q6H PRN Richard Chisholm MD   20 mg at 06/12/18 0546   • sodium " chloride 0.9 % flush 1-10 mL  1-10 mL Intravenous PRN Matthwe Rangel MD       • sodium chloride 0.9 % infusion  100 mL/hr Intravenous Continuous Matthew Rangel  mL/hr at 06/12/18 0644 100 mL/hr at 06/12/18 0644       Review of Systems:   -A 14 point review of systems is completed and is negative except for confusion      Objective      Vital Signs  Temp:  [98 °F (36.7 °C)-98.9 °F (37.2 °C)] 98 °F (36.7 °C)  Heart Rate:  [64-89] 89  Resp:  [16-20] 16  BP: (121-170)/(62-85) 121/62    Physical Exam:    General Exam:  Head:  Normal cephalic, atraumatic  HEENT:  Neck supple  Fundoscopic Exam:  No signs of disc edema  CVS:  Regular rate and rhythm.  No murmurs  Carotid Examination:  No bruits  Lungs:  Clear to auscultation  Abdomen:  Non-tender, Non-distended  Extremities:  No signs of peripheral edema  Skin:  No rashes     Neurologic Exam:     Mental Status:    -Awake and attempts to answer questions.  Does not follow commands     CN II:  Visual fields full.  Pupils equally reactive to light  CN III, IV, VI:  Extraocular Muscles full with no signs of nystagmus  CN V:  Facial sensory is symmetric with no asymmetries.  CN VII:  Facial motor symmetric  CN VIII:  Gross hearing intact bilaterally  CN IX:  Palate elevates symmetrically  CN X:  Palate elevates symmetrically  CN XI:  Shoulder shrug symmetric  CN XII:  Tongue is midline on protrusion     Motor:      Moves all ext's  DTR:  -Right              Bicep: 2+         Tricep: 2+        Brachoradialis: 2+              Patella: 2+       Ankle: 2+         Neg Babinski  -Left              Bicep: 2+         Tricep: 2+        Brachoradialis: 2+              Patella: 2+       Ankle: 2+         Neg Babinski     Sensory:  -Withdrawal noted throughout     Coordination:  -No active tremors     Gait  -Nonambulatory        Results Review:    I reviewed the patient's new clinical results.      Results from last 7 days  Lab Units 06/11/18  0544   WBC 10*3/mm3 8.95    HEMOGLOBIN g/dL 14.3   HEMATOCRIT % 39.8*   PLATELETS 10*3/mm3 337          Results from last 7 days  Lab Units 06/12/18  0519 06/11/18  0544   SODIUM mmol/L 128* 127*   POTASSIUM mmol/L 3.9 4.3   CHLORIDE mmol/L 94* 94*   CO2 mmol/L 23.0* 25.0   BUN mg/dL 13 16   CREATININE mg/dL 0.97 0.93   CALCIUM mg/dL 8.2* 8.5   BILIRUBIN mg/dL  --  0.5   ALK PHOS U/L  --  72   ALT (SGPT) U/L  --  26   AST (SGOT) U/L  --  21   GLUCOSE mg/dL 80 105*        Lab Results   Component Value Date    PHOS 4.0 06/11/2018    MG 1.9 06/11/2018    PROTIME 13.6 06/11/2018    INR 1.01 06/11/2018     No components found for: POCGLUC  No components found for: A1C  No results found for: HDL, LDL  No components found for: B12  Lab Results   Component Value Date    TSH 2.400 06/11/2018     Drug screen normal  BNP normal  Vitamin B-12 normal  Ammonia level normal  Folate level normal  TSH normal  B1 level pending  Assessment/Plan     Hospital Problem List    Active Problems:    Unresponsiveness    Impression:  1.  End-stage Alzheimer's disease based on history and neuro imaging  2.  Behavioral disturbance  3.  No signs of seizure activity  4.  Hyponatremia    Plan:  · B12 - normal  · Folate - normal  · TSH - normal  · Ammonia - normal  · B1 - pending  · Treatment of UTI by primary team  · Stop Risperidone  · Stop Venlafaxine        Sheng Ayoub MD  06/12/18  11:42 AM      Electronically signed by Sheng Ayoub MD at 6/12/2018 11:54 AM     Richard Chisholm MD at 6/11/2018  2:28 PM              [facility] Medicine Services  INPATIENT PROGRESS NOTE    Length of Stay: 1  Date of Admission: 6/10/2018  Primary Care Physician: Titus Rubalcava II    Subjective   Chief Complaint:  Follow-up  HPI   Son is at bedside.  Patient has had previous episodes similar to this about 1 month ago that lasted 12hours.    He is times he had urinary tract infection.  He was transferred in our hospital for neurology  consultation  Dr. Ayoub had seen the patient and requested further testing; he stopped risperidone and venlafaxine  Review of Systems   Unobtainable; patient is lethargic       Objective    Temp:  [97.2 °F (36.2 °C)-98.2 °F (36.8 °C)] 98.2 °F (36.8 °C)  Heart Rate:  [56-78] 64  Resp:  [14-18] 18  BP: (157-186)/(66-81) 157/66  Physical Exam  They will to respond even upon shaking him by his son  His pupils are surgical more pronounced on th eright  Normocephalic and atraumatic  No thyromegaly, no jvd  s1 s2  rrr no gallop, no gross murmur  soft obese abd, NABS, no obvious mass  No c/c/e  Warm dry skin  No gross skin lesions        Results Review:  I have reviewed the labs, radiology results, and diagnostic studies.    Laboratory Data:     Results from last 7 days  Lab Units 06/11/18  0544   WBC 10*3/mm3 8.95   HEMOGLOBIN g/dL 14.3   HEMATOCRIT % 39.8*   PLATELETS 10*3/mm3 337          Results from last 7 days  Lab Units 06/11/18  0544   SODIUM mmol/L 127*   POTASSIUM mmol/L 4.3   CHLORIDE mmol/L 94*   CO2 mmol/L 25.0   BUN mg/dL 16   CREATININE mg/dL 0.93   CALCIUM mg/dL 8.5   BILIRUBIN mg/dL 0.5   ALK PHOS U/L 72   ALT (SGPT) U/L 26   AST (SGOT) U/L 21   GLUCOSE mg/dL 105*       Culture Data:        Radiology Data:   Imaging Results (last 24 hours)     ** No results found for the last 24 hours. **          I have reviewed the patient current medications.     Assessment/Plan     Hospital Problem List     Unresponsiveness        Encephalopathy - ? Drug induced possible; Pyuria - possibly UTI; possibly metabolic due to hyponatremia  Hx of depression  Hx of dementia  Hx of HTN        appreciate Neurology input  Monitor clinical status  Empric abx fo rpossibly uti  Prn IV antihtn vs clonidine patch  Bmp in AM     famotidine 40 mg Oral Daily   heparin (porcine) 5,000 Units Subcutaneous Q12H       Discharge Planning: to be determined    Richard Chisholm MD   06/11/18   2:28 PM      Electronically signed by Richard  Samson Chisholm MD at 6/11/2018  3:05 PM

## 2018-06-12 NOTE — PROGRESS NOTES
"  Neurology Progress Note      Chief Complaint:  Altered mental status    Subjective     Subjective:    Somewhat more awake today and tries to answer questions.  Does not follow commands.  Often says \"what\" and then repeats question.    Medications:  Current Facility-Administered Medications   Medication Dose Route Frequency Provider Last Rate Last Dose   • cefTRIAXone (ROCEPHIN) 1 g/10mL IV PUSH syringe  1 g Intravenous Q24H Richard Chisholm MD   1 g at 06/11/18 1630   • famotidine (PEPCID) tablet 40 mg  40 mg Oral Daily Matthew Rangel MD       • heparin (porcine) 5000 UNIT/ML injection 5,000 Units  5,000 Units Subcutaneous Q12H Matthew Rangel MD   5,000 Units at 06/12/18 0828   • hydrALAZINE (APRESOLINE) injection 20 mg  20 mg Intravenous Q6H PRN Richard Chisholm MD   20 mg at 06/12/18 0546   • sodium chloride 0.9 % flush 1-10 mL  1-10 mL Intravenous PRN Matthew Rangel MD       • sodium chloride 0.9 % infusion  100 mL/hr Intravenous Continuous Matthew Rangel  mL/hr at 06/12/18 0644 100 mL/hr at 06/12/18 0644       Review of Systems:   -A 14 point review of systems is completed and is negative except for confusion      Objective      Vital Signs  Temp:  [98 °F (36.7 °C)-98.9 °F (37.2 °C)] 98 °F (36.7 °C)  Heart Rate:  [64-89] 89  Resp:  [16-20] 16  BP: (121-170)/(62-85) 121/62    Physical Exam:    General Exam:  Head:  Normal cephalic, atraumatic  HEENT:  Neck supple  Fundoscopic Exam:  No signs of disc edema  CVS:  Regular rate and rhythm.  No murmurs  Carotid Examination:  No bruits  Lungs:  Clear to auscultation  Abdomen:  Non-tender, Non-distended  Extremities:  No signs of peripheral edema  Skin:  No rashes     Neurologic Exam:     Mental Status:    -Awake and attempts to answer questions.  Does not follow commands     CN II:  Visual fields full.  Pupils equally reactive to light  CN III, IV, VI:  Extraocular Muscles full with no signs of nystagmus  CN V:  Facial sensory is " symmetric with no asymmetries.  CN VII:  Facial motor symmetric  CN VIII:  Gross hearing intact bilaterally  CN IX:  Palate elevates symmetrically  CN X:  Palate elevates symmetrically  CN XI:  Shoulder shrug symmetric  CN XII:  Tongue is midline on protrusion     Motor:      Moves all ext's  DTR:  -Right              Bicep: 2+         Tricep: 2+        Brachoradialis: 2+              Patella: 2+       Ankle: 2+         Neg Babinski  -Left              Bicep: 2+         Tricep: 2+        Brachoradialis: 2+              Patella: 2+       Ankle: 2+         Neg Babinski     Sensory:  -Withdrawal noted throughout     Coordination:  -No active tremors     Gait  -Nonambulatory        Results Review:    I reviewed the patient's new clinical results.      Results from last 7 days  Lab Units 06/11/18  0544   WBC 10*3/mm3 8.95   HEMOGLOBIN g/dL 14.3   HEMATOCRIT % 39.8*   PLATELETS 10*3/mm3 337          Results from last 7 days  Lab Units 06/12/18  0519 06/11/18  0544   SODIUM mmol/L 128* 127*   POTASSIUM mmol/L 3.9 4.3   CHLORIDE mmol/L 94* 94*   CO2 mmol/L 23.0* 25.0   BUN mg/dL 13 16   CREATININE mg/dL 0.97 0.93   CALCIUM mg/dL 8.2* 8.5   BILIRUBIN mg/dL  --  0.5   ALK PHOS U/L  --  72   ALT (SGPT) U/L  --  26   AST (SGOT) U/L  --  21   GLUCOSE mg/dL 80 105*        Lab Results   Component Value Date    PHOS 4.0 06/11/2018    MG 1.9 06/11/2018    PROTIME 13.6 06/11/2018    INR 1.01 06/11/2018     No components found for: POCGLUC  No components found for: A1C  No results found for: HDL, LDL  No components found for: B12  Lab Results   Component Value Date    TSH 2.400 06/11/2018     Drug screen normal  BNP normal  Vitamin B-12 normal  Ammonia level normal  Folate level normal  TSH normal  B1 level pending  Assessment/Plan     Hospital Problem List    Active Problems:    Unresponsiveness    Impression:  1.  End-stage Alzheimer's disease based on history and neuro imaging  2.  Behavioral disturbance  3.  No signs of seizure  activity  4.  Hyponatremia    Plan:  · B12 - normal  · Folate - normal  · TSH - normal  · Ammonia - normal  · B1 - pending  · Treatment of UTI by primary team  · Stop Risperidone  · Stop Venlafaxine        Sheng Ayoub MD  06/12/18  11:42 AM

## 2018-06-12 NOTE — PROGRESS NOTES
Continued Stay Note   [facility]     Patient Name: Cherelle Davis  MRN: [id number]  Today's Date: 6/12/2018    Admit Date: 6/10/2018          Discharge Plan     Row Name 06/12/18 1516       Plan    Plan Referral to [facility]    Patient/Family in Agreement with Plan yes    Plan Comments SW spoke to patient's son in room re discharge planning. Patient's son would like to pursue placement at [facility] in [place], [place] as this facility is closest to his home and patient will likely be going long term. [facility] faxed referral to Ilene in admissions at [facility] at [phone]. Will follow for bed offer. Patient's son does not have an alternate option for placement at this time.            PONCHO NewellW

## 2018-06-12 NOTE — PLAN OF CARE
Problem: Patient Care Overview  Goal: Plan of Care Review  Outcome: Ongoing (interventions implemented as appropriate)   06/11/18 1500   Coping/Psychosocial   Plan of Care Reviewed With patient   Plan of Care Review   Progress improving   OTHER   Outcome Summary Started this morning with randomly asking what day it was then went back to being unresponsive. HAs remained this way throughout the day occassionaly opening eyes with son trying to awaken. no skin issues assessed. carranza in place. has started to open eyes more and asked when [place] was going to start playing this evening but would not respond in return.        Problem: Fall Risk (Adult)  Goal: Absence of Fall  Outcome: Ongoing (interventions implemented as appropriate)      Problem: Skin Injury Risk (Adult)  Goal: Skin Health and Integrity  Outcome: Ongoing (interventions implemented as appropriate)      Problem: Confusion, Acute (Adult)  Goal: Cognitive/Functional Impairments Minimized  Outcome: Ongoing (interventions implemented as appropriate)    Goal: Safety  Outcome: Ongoing (interventions implemented as appropriate)

## 2018-06-13 LAB
ANION GAP SERPL CALCULATED.3IONS-SCNC: 8 MMOL/L (ref 4–13)
BUN BLD-MCNC: 18 MG/DL (ref 5–21)
BUN/CREAT SERPL: 16.4 (ref 7–25)
CALCIUM SPEC-SCNC: 7.8 MG/DL (ref 8.4–10.4)
CHLORIDE SERPL-SCNC: 99 MMOL/L (ref 98–110)
CO2 SERPL-SCNC: 22 MMOL/L (ref 24–31)
CREAT BLD-MCNC: 1.1 MG/DL (ref 0.5–1.4)
GFR SERPL CREATININE-BSD FRML MDRD: 63 ML/MIN/1.73
GLUCOSE BLD-MCNC: 85 MG/DL (ref 70–100)
POTASSIUM BLD-SCNC: 4.1 MMOL/L (ref 3.5–5.3)
SODIUM BLD-SCNC: 129 MMOL/L (ref 135–145)
VIT B1 BLD-SCNC: 122.2 NMOL/L (ref 66.5–200)

## 2018-06-13 PROCEDURE — G8997 SWALLOW GOAL STATUS: HCPCS

## 2018-06-13 PROCEDURE — 92610 EVALUATE SWALLOWING FUNCTION: CPT

## 2018-06-13 PROCEDURE — G8987 SELF CARE CURRENT STATUS: HCPCS

## 2018-06-13 PROCEDURE — 25010000002 HEPARIN (PORCINE) PER 1000 UNITS: Performed by: FAMILY MEDICINE

## 2018-06-13 PROCEDURE — 80048 BASIC METABOLIC PNL TOTAL CA: CPT | Performed by: INTERNAL MEDICINE

## 2018-06-13 PROCEDURE — 25010000002 CEFTRIAXONE 1 G/10ML IV PUSH SYRINGE KIT (PAD): Performed by: INTERNAL MEDICINE

## 2018-06-13 PROCEDURE — G8996 SWALLOW CURRENT STATUS: HCPCS

## 2018-06-13 PROCEDURE — G8998 SWALLOW D/C STATUS: HCPCS

## 2018-06-13 PROCEDURE — G8988 SELF CARE GOAL STATUS: HCPCS

## 2018-06-13 PROCEDURE — 97166 OT EVAL MOD COMPLEX 45 MIN: CPT

## 2018-06-13 RX ORDER — TOLVAPTAN 15 MG/1
15 TABLET ORAL ONCE
Status: COMPLETED | OUTPATIENT
Start: 2018-06-13 | End: 2018-06-13

## 2018-06-13 RX ADMIN — FAMOTIDINE 40 MG: 20 TABLET, FILM COATED ORAL at 10:16

## 2018-06-13 RX ADMIN — DONEPEZIL HYDROCHLORIDE 10 MG: 10 TABLET, FILM COATED ORAL at 21:41

## 2018-06-13 RX ADMIN — CARVEDILOL 3.12 MG: 3.12 TABLET, FILM COATED ORAL at 10:16

## 2018-06-13 RX ADMIN — CARVEDILOL 3.12 MG: 3.12 TABLET, FILM COATED ORAL at 21:41

## 2018-06-13 RX ADMIN — CEFTRIAXONE SODIUM 1 G: 1 INJECTION, POWDER, FOR SOLUTION INTRAMUSCULAR; INTRAVENOUS at 17:13

## 2018-06-13 RX ADMIN — HEPARIN SODIUM 5000 UNITS: 5000 INJECTION INTRAVENOUS; SUBCUTANEOUS at 21:41

## 2018-06-13 RX ADMIN — HEPARIN SODIUM 5000 UNITS: 5000 INJECTION INTRAVENOUS; SUBCUTANEOUS at 10:17

## 2018-06-13 RX ADMIN — MEMANTINE HYDROCHLORIDE 10 MG: 5 TABLET, FILM COATED ORAL at 10:17

## 2018-06-13 RX ADMIN — TOLVAPTAN 15 MG: 15 TABLET ORAL at 13:29

## 2018-06-13 NOTE — THERAPY EVALUATION
Acute Care - Occupational Therapy Initial Evaluation  [facility]     Patient Name: Cherelle Davis  : 1929  MRN: [id number]  Today's Date: 2018  Onset of Illness/Injury or Date of Surgery: 18  Date of Referral to OT: 18  Referring Physician: Dr. Chisholm    Admit Date: 6/10/2018       ICD-10-CM ICD-9-CM   1. Impaired mobility and ADLs Z74.09 799.89     Patient Active Problem List   Diagnosis   • Unresponsiveness     Past Medical History:   Diagnosis Date   • Anxiety    • BPH (benign prostatic hyperplasia)    • Dementia    • Depression    • GERD (gastroesophageal reflux disease)    • Hypertension    • Renal disease    • TIA (transient ischemic attack)      Past Surgical History:   Procedure Laterality Date   • CATARACT EXTRACTION     • CHOLECYSTECTOMY            OT ASSESSMENT FLOWSHEET (last 72 hours)      Occupational Therapy Evaluation     Row Name 18 0809                   OT Evaluation Time/Intention    Subjective Information no complaints  -AC (r) MK (t) AC (c)        Document Type evaluation  -AC (r) MK (t) AC (c)        Mode of Treatment occupational therapy  -AC (r) MK (t) AC (c)        Symptoms Noted During/After Treatment shortness of breath  -AC (r) MK (t) AC (c)        Comment pt has no complaints, however has difficulty keeping his eyes open during eval  -AC (r) MK (t) AC (c)           General Information    Patient Profile Reviewed? yes  -AC (r) MK (t) AC (c)        Onset of Illness/Injury or Date of Surgery 18  -AC (r) MK (t) AC (c)        Referring Physician Dr. Chisholm  -AC (r) MK (t) AC (c)        Patient Observations cooperative;agree to therapy  -AC (r) MK (t) AC (c)        Patient/Family Observations daughter present, assists w hx  -AC (r) MK (t) AC (c)        General Observations of Patient asleep in [place], very [place], carranza  -AC (r) MK (t) AC (c)        Prior Level of Function --   unsure of PLOF, pt facility resident and participating in PT  -AC  (r) MK (t) AC (c)        Pertinent History of Current Functional Problem transferred from [facility], [facility], change in status over the last month, pt is a facility resident w diagnosis of end-stage Alzheimer's disease  -AC (r) MK (t) AC (c)        Existing Precautions/Restrictions fall  -AC (r) MK (t) AC (c)        Limitations/Impairments safety/cognitive  -AC (r) MK (t) AC (c)        Equipment Issued to Patient gait belt  -AC (r) MK (t) AC (c)        Risks Reviewed patient and family:;LOB;nausea/vomiting;dizziness;increased discomfort;lines disloged  -AC (r) MK (t) AC (c)        Benefits Reviewed patient and family:;improve function;increase independence;increase strength;increase balance;decrease pain;increase knowledge  -AC (r) MK (t) AC (c)        Barriers to Rehab cognitive status  -AC (r) MK (t) AC (c)           Relationship/Environment    Lives With facility resident  -AC (r) MK (t) AC (c)        Family Caregiver if Needed child(meghna), adult  -AC (r) MK (t) AC (c)           Resource/Environmental Concerns    Current Living Arrangements extended care facility  -AC (r) MK (t) AC (c)           Cognitive Assessment/Interventions    Additional Documentation Cognitive Assessment/Intervention (Group)  -AC (r) MK (t) AC (c)           Cognitive Assessment/Intervention- PT/OT    Affect/Mental Status (Cognitive) WFL  -AC (r) MK (t) AC (c)        Orientation Status (Cognition) oriented to;person;place;disoriented to;time  -AC (r) MK (t) AC (c)        Follows Commands (Cognition) follows one step commands;50-74% accuracy;increased processing time needed  -AC (r) MK (t) AC (c)        Safety Deficit (Cognitive) mild deficit;ability to follow commands;awareness of need for assistance;impulsivity  -AC (r) MK (t) AC (c)        Personal Safety Interventions elopement precautions initiated;fall prevention program maintained;gait belt;nonskid shoes/slippers when out of bed;supervised activity  -AC (r) MK (t) AC (c)        Cognitive  Assessment/Intervention Comment difficulty following commands, however ability improves with functional commands  -AC (r) MK (t) AC (c)           Safety Issues, Functional Mobility    Safety Issues Affecting Function (Mobility) ability to follow commands;impulsivity;insight into deficits/self awareness  -AC (r) MK (t) AC (c)        Impairments Affecting Function (Mobility) balance;coordination;endurance/activity tolerance;motor control;shortness of breath;strength  -AC (r) MK (t) AC (c)           Bed Mobility Assessment/Treatment    Bed Mobility Assessment/Treatment rolling right;scooting/bridging;supine-sit;sit-supine  -AC (r) MK (t) AC (c)        Rolling Right [place] (Bed Mobility) verbal cues;nonverbal cues (demo/gesture);contact guard  -AC (r) MK (t) AC (c)        Scooting/Bridging [place] (Bed Mobility) contact guard  -AC (r) MK (t) AC (c)        Supine-Sit [place] (Bed Mobility) minimum assist (75% patient effort);verbal cues  -AC (r) MK (t) AC (c)        Sit-Supine [place] (Bed Mobility) contact guard  -AC (r) MK (t) AC (c)        Bed Mobility, Safety Issues cognitive deficits limit understanding  -AC (r) MK (t) AC (c)        Assistive Device (Bed Mobility) bed rails;head of bed elevated;draw sheet  -AC (r) MK (t) AC (c)           Functional Mobility    Functional Mobility- Ind. Level minimum assist (75% patient effort);verbal cues required;nonverbal cues required (demo/gesture)  -AC (r) MK (t) AC (c)        Functional Mobility- Safety Issues weight-shifting ability decreased;balance decreased during turns  -AC (r) MK (t) AC (c)        Functional Mobility- Comment difficulty following commands for taking steps at EOB, fatigues quickly and sat impulsively, HHA provided  -AC (r) MK (t) AC (c)           Transfer Assessment/Treatment    Transfer Assessment/Treatment sit-stand transfer;stand-sit transfer  -AC (r) MK (t) AC (c)        Sit-Stand [place] (Transfers) contact guard  -AC (r) MK  (t) AC (c)        Stand-Sit [place] (Transfers) contact guard  -AC (r) MK (t) AC (c)           ADL Assessment/Intervention    BADL Assessment/Intervention lower body dressing;feeding  -AC (r) MK (t) AC (c)           Lower Body Dressing Assessment/Training    Lower Body Dressing [place] Level don;doff;socks;contact guard assist  -AC (r) MK (t) AC (c)        Lower Body Dressing Position edge of bed sitting  - (r) MK (t) AC (c)        Comment (Lower Body Dressing) CGA provided for balance, however no balance deficits noted, pt able to bring BLE to knee to don socks, intention tremor present  -AC (r) MK (t) AC (c)           Self-Feeding Assessment/Training    [place] Level (Feeding) scoop food and bring to mouth;supervision;prepare tray/open items;moderate assist (50% patient effort)  -AC (r) MK (t) AC (c)        Self-Feeding Assess/Train, Spillage Amount minimal  - (r) MK (t) AC (c)        Position (Self-Feeding) sitting up in bed  - (r) MK (t) AC (c)        Comment (Feeding) intention tremor present, minimal spillage when bringing utensil to mouth  -AC (r) MK (t) AC (c)           BADL Safety/Performance    Impairments, BADL Safety/Performance balance;cognition;endurance/activity tolerance;coordination;motor control;shortness of breath;strength  -AC (r) MK (t) AC (c)        Cognitive Impairments, BADL Safety/Performance awareness, need for assistance;impulsivity;insight into deficits/self awareness  - (r) MK (t) AC (c)           General ROM    GENERAL ROM COMMENTS AROM WFL BUE  - (r) MK (t) AC (c)           General Assessment (Manual Muscle Testing)    Comment, General Manual Muscle Testing (MMT) Assessment 4+/5 BUE  -AC (r) MK (t) AC (c)           Motor Assessment/Interventions    Additional Documentation Balance (Group)  -AC (r) MK (t) AC (c)           Balance    Balance static sitting balance;static standing balance;dynamic sitting balance;dynamic standing balance  -AC (r) MK (t) AC (c)            Static Sitting Balance    Level of [place] (Unsupported Sitting, Static Balance) contact guard assist  -AC (r) MK (t) AC (c)        Sitting Position (Unsupported Sitting, Static Balance) sitting on edge of bed  -AC (r) MK (t) AC (c)           Dynamic Sitting Balance    Level of [place], Reaches Outside Midline (Sitting, Dynamic Balance) contact guard assist  -AC (r) MK (t) AC (c)        Sitting Position, Reaches Outside Midline (Sitting, Dynamic Balance) sitting on edge of bed  -AC (r) MK (t) AC (c)        Comment, Reaches Outside Midline (Sitting, Dynamic Balance) maintains balance when moving outside of midline for LB dressing task and scooting  -AC (r) MK (t) AC (c)           Static Standing Balance    Level of [place] (Supported Standing, Static Balance) minimal assist, 75% patient effort  -AC (r) MK (t) AC (c)           Dynamic Standing Balance    Level of [place], Reaches Outside Midline (Standing, Dynamic Balance) minimal assist, 75% patient effort  -AC (r) MK (t) AC (c)           Sensory Assessment/Intervention    Sensory General Assessment no sensation deficits identified   per pt  -AC (r) MK (t) AC (c)           Positioning and Restraints    Pre-Treatment Position in bed  -AC (r) MK (t) AC (c)        Post Treatment Position bed  -AC (r) MK (t) AC (c)        In Bed fowlers;call light within reach;encouraged to call for assist;exit alarm on;with family/caregiver;side rails up x2;SCD pump applied  -AC (r) MK (t) AC (c)           Pain Assessment    Additional Documentation Pain Scale: Numbers Pre/Post-Treatment (Group)  -AC (r) MK (t) AC (c)           Pain Scale: Numbers Pre/Post-Treatment    Pain Scale: Numbers, Pretreatment 0/10 - no pain  -AC (r) MK (t) AC (c)        Pain Scale: Numbers, Post-Treatment 0/10 - no pain  -AC (r) MK (t) AC (c)        Pain Intervention(s) Repositioned;Ambulation/increased activity  -AC (r) MK (t) AC (c)           Plan of Care Review    Plan of Care  Reviewed With patient  -AC (r) MK (t) AC (c)           Clinical Impression (OT)    Date of Referral to OT 06/12/18  -AC (r) MK (t) AC (c)        OT Diagnosis decreased ADL  -AC (r) MK (t) AC (c)        Prognosis (OT Eval) good  -AC (r) MK (t) AC (c)        Patient/Family Goals Statement (OT Eval) increase participation in ADL, increase functional mobility  -AC (r) MK (t) AC (c)        Criteria for Skilled Therapeutic Interventions Met (OT Eval) yes;treatment indicated  -AC (r) MK (t) AC (c)        Rehab Potential (OT Eval) good, to achieve stated therapy goals  -AC (r) MK (t) AC (c)        Therapy Frequency (OT Eval) daily  -AC (r) MK (t) AC (c)        Predicted Duration of Therapy Intervention (Therapy Eval) 10 days  -AC (r) MK (t) AC (c)        Care Plan Review (OT) evaluation/treatment results reviewed;care plan/treatment goals reviewed;risks/benefits reviewed;current/potential barriers reviewed;patient/other agree to care plan  -AC (r) MK (t) AC (c)        Care Plan Review, Other Participant (OT Eval) daughter  -AC (r) MK (t) AC (c)        Anticipated Discharge Disposition (OT) skilled nursing facility  -AC (r) MK (t) AC (c)           Planned OT Interventions    Planned Therapy Interventions (OT Eval) activity tolerance training;BADL retraining;functional balance retraining;occupation/activity based interventions;patient/caregiver education/training;strengthening exercise;transfer/mobility retraining  -AC (r) MK (t) AC (c)           OT Goals    Transfer Goal Selection (OT) transfer, OT goal 1  -AC (r) MK (t) AC (c)        Dressing Goal Selection (OT) dressing, OT goal 1  -AC (r) MK (t) AC (c)        Grooming Goal Selection (OT) grooming, OT goal 1  -AC (r) MK (t) AC (c)        Balance Goal Selection (OT) balance, OT goal 1  -AC (r) MK (t) AC (c)        Additional Documentation Grooming Goal Selection (OT) (Row);Balance Goal Selection (OT) (Row)  -AC (r) MK (t) AC (c)           Transfer Goal 1 (OT)     Activity/Assistive Device (Transfer Goal 1, OT) toilet;shower chair   DME prn  -AC (r) MK (t) AC (c)        [place] Level/Cues Needed (Transfer Goal 1, OT) minimum assist (75% or more patient effort)  -AC (r) MK (t) AC (c)        Time Frame (Transfer Goal 1, OT) 10 days  -AC (r) MK (t) AC (c)        Progress/Outcome (Transfer Goal 1, OT) goal ongoing  -AC (r) MK (t) AC (c)           Dressing Goal 1 (OT)    Activity/Assistive Device (Dressing Goal 1, OT) lower body dressing   all LB dressing tasks including standing to don pants  -AC (r) MK (t) AC (c)        [place]/Cues Needed (Dressing Goal 1, OT) minimum assist (75% or more patient effort)  -AC (r) MK (t) AC (c)        Time Frame (Dressing Goal 1, OT) 10 days  -AC (r) MK (t) AC (c)        Progress/Outcome (Dressing Goal 1, OT) goal ongoing  -AC (r) MK (t) AC (c)           Grooming Goal 1 (OT)    Activity/Device (Grooming Goal 1, OT) oral care;wash face, hands  -AC (r) MK (t) AC (c)        [place] (Grooming Goal 1, OT) minimum assist (75% or more patient effort)  -AC (r) MK (t) AC (c)        Time Frame (Grooming Goal 1, OT) 10 days  -AC (r) MK (t) AC (c)        Progress/Outcome (Grooming Goal 1, OT) goal ongoing  -AC (r) MK (t) AC (c)           Balance Goal 1 (OT)    Activity/Assistive Device (Balance Goal 1, OT) standing, static;standing, dynamic;walker, rolling   complete functional task in standing for 5 min w no LOB  -AC (r) MK (t) AC (c)        [place] Level/Cues Needed (Balance Goal 1, OT) contact guard assist  -AC (r) MK (t) AC (c)        Time Frame (Balance Goal 1, OT) 10 days  -AC (r) MK (t) AC (c)        Progress/Outcomes (Balance Goal 1, OT) goal ongoing  -AC (r) MK (t) AC (c)          User Key  (r) = Recorded By, (t) = Taken By, (c) = Cosigned By    Initials Name Effective Dates    AC Eddie Cutler, OTR/L 06/22/15 -     DINESH Duncan, OT 05/09/18 -            Occupational Therapy Education     Title: PT OT SLP Therapies (Done)      "Topic: Occupational Therapy (Done)     Point: ADL training (Done)     Description: Instruct learner(s) on proper safety adaptation and remediation techniques during self care or transfers.   Instruct in proper use of assistive devices.   Learning Progress Summary     Learner Status Readiness Method Response Comment Documented by    Patient Done Acceptance E NR,VU benefit of OT, benefit of increased activity, OT POC  06/13/18 1023    Family Done Acceptance E NR,VU benefit of OT, benefit of increased activity, OT POC  06/13/18 1023                      User Key     Initials Effective Dates Name Provider Type Discipline     05/09/18 -  Prema Duncan OT Occupational Therapist OT                  OT Recommendation and Plan  Outcome Summary/Treatment Plan (OT)  Anticipated Discharge Disposition (OT): skilled nursing facility  Planned Therapy Interventions (OT Eval): activity tolerance training, BADL retraining, functional balance retraining, occupation/activity based interventions, patient/caregiver education/training, strengthening exercise, transfer/mobility retraining  Therapy Frequency (OT Eval): daily  Plan of Care Review  Plan of Care Reviewed With: patient  Plan of Care Reviewed With: patient  Outcome Summary: OT eval completed. Pt asleep, easy to arouse, however has diffculty keeping his eyes open throughout eval requiring max vc. Pt very [place] and has difficulty following commands, improves with common functional commands (i.e \"lets sit up\").  Pt able to sit at EOB w CGA to don/doff B socks w no LOB. Sit<>stand CGA. Pt is impulsive requiring repetition to slow or follow safety precautions. Skilled OT required to improve functional mobility, transfer, safety awareness, and ADL participation. OT recommnds d/c to SNF.          Outcome Measures     Row Name 06/13/18 1000             How much help from another is currently needed...    Putting on and taking off regular lower body clothing? 3  -MK      Bathing " (including washing, rinsing, and drying) 2  -MK      Toileting (which includes using toilet bed pan or urinal) 2  -MK      Putting on and taking off regular upper body clothing 3  -MK      Taking care of personal grooming (such as brushing teeth) 3  -MK      Eating meals 3  -MK      Score 16  -MK         Functional Assessment    Outcome Measure Options AM-PAC 6 Clicks Daily Activity (OT)  -MK        User Key  (r) = Recorded By, (t) = Taken By, (c) = Cosigned By    Initials Name Provider Type    DINESH Duncan OT Occupational Therapist          Time Calculation:   OT Start Time: 0912  OT Stop Time: 0955  OT Time Calculation (min): 43 min  Therapy Suggested Charges     Code   Minutes Charges    None           Therapy Charges for Today     Code Description Service Date Service Provider Modifiers Qty    [id number]  OT SELFCARE CURRENT 6/13/2018 ELEANOR Russ CK 1    [id number]  OT SELFCARE PROJECTED 6/13/2018 ELEANOR Russ CJ 1    [id number]  OT EVAL MOD COMPLEXITY 3 6/13/2018 ELEANOR Russ KX 1          OT G-codes  OT Professional Judgement Used?: Yes  OT Functional Scales Options: AM-PAC 6 Clicks Daily Activity (OT)  Score: 16  Functional Limitation: Self care  Self Care Current Status (): At least 40 percent but less than 60 percent impaired, limited or restricted  Self Care Goal Status (): At least 20 percent but less than 40 percent impaired, limited or restricted    Prema Duncan OT  6/13/2018

## 2018-06-13 NOTE — PROGRESS NOTES
"    [facility] Medicine Services  INPATIENT PROGRESS NOTE    Length of Stay: 3  Date of Admission: 6/10/2018  Primary Care Physician: Titus Rubalcava II    Subjective   Chief Complaint: f/u  HPI   Doing significantly better  More awake and responsive with use of longer sentences compared to short phrases yesterday  I caught her daughter at the hallway who said \"he miraculously turned around.\"  She also mentioned about his \"behavior\"... What if?   I think this was the reason he may have been placed on risperidone which made him unresponsive.   It took it a while to wake up or to get the med wear off his system possibly  \"Hungry\"    Review of Systems     All pertinent negatives and positives are as above. All other systems have been reviewed and are negative unless otherwise stated.     Objective    Temp:  [98.5 °F (36.9 °C)-99.2 °F (37.3 °C)] 98.6 °F (37 °C)  Heart Rate:  [] 102  Resp:  [18-20] 20  BP: (104-178)/(50-89) 148/69  Physical Exam  He is spontaneously opens his eyes and has movement of extremities,  he is significantly a lot better in terms of level of mentation    Normocephalic and atraumatic  No thyromegaly, no jvd  s1 s2  rrr no gallop, no gross murmur  soft obese abd, NABS, no obvious mass  No c/c/e  Warm dry skin  No gross skin lesions         Results Review:  I have reviewed the labs, radiology results, and diagnostic studies.    Laboratory Data:     Results from last 7 days  Lab Units 06/11/18  0544   WBC 10*3/mm3 8.95   HEMOGLOBIN g/dL 14.3   HEMATOCRIT % 39.8*   PLATELETS 10*3/mm3 337          Results from last 7 days  Lab Units 06/13/18  0448 06/12/18  0519 06/11/18  0544   SODIUM mmol/L 129* 128* 127*   POTASSIUM mmol/L 4.1 3.9 4.3   CHLORIDE mmol/L 99 94* 94*   CO2 mmol/L 22.0* 23.0* 25.0   BUN mg/dL 18 13 16   CREATININE mg/dL 1.10 0.97 0.93   CALCIUM mg/dL 7.8* 8.2* 8.5   BILIRUBIN mg/dL  --   --  0.5   ALK PHOS U/L  --   --  72   ALT (SGPT) U/L  --   " --  26   AST (SGOT) U/L  --   --  21   GLUCOSE mg/dL 85 80 105*       Culture Data:        Radiology Data:   Imaging Results (last 24 hours)     ** No results found for the last 24 hours. **          I have reviewed the patient current medications.     Assessment/Plan     Hospital Problem List     Unresponsiveness          Encephalopathy - ? Drug induced possible; Pyuria - possibly UTI; possibly metabolic due to hyponatremia  Hx of depression  Hx of dementia (end stage alzheimer)  Hx of HTN - start on carvedilol;  Tachycardia - maintaining sinus    Plans   He is about to complete 3 day course of ceftriaxone for suspected UTI although could not be proven as there was no culture sent  He likely is at his baseline mental status  D/w SW - awaiting placement  Increase activities  [facility] again today and monitor BMP  PT/OT      Discharge Plannin day likely if SNF is available.    Richard Chisholm MD   18   1:35 PM

## 2018-06-13 NOTE — PLAN OF CARE
Problem: Patient Care Overview  Goal: Plan of Care Review  Outcome: Ongoing (interventions implemented as appropriate)   06/13/18 0317   Coping/Psychosocial   Plan of Care Reviewed With patient   Plan of Care Review   Progress no change   OTHER   Outcome Summary vss; alert and oriented to self only; pt was non verbal; very [place]; aroused to voice easily; PO meds given; turned every 2 hours; carranza intact; continue to monitor     Goal: Individualization and Mutuality  Outcome: Ongoing (interventions implemented as appropriate)    Goal: Discharge Needs Assessment  Outcome: Ongoing (interventions implemented as appropriate)    Goal: Interprofessional Rounds/Family Conf  Outcome: Ongoing (interventions implemented as appropriate)      Problem: Fall Risk (Adult)  Goal: Absence of Fall  Outcome: Ongoing (interventions implemented as appropriate)      Problem: Skin Injury Risk (Adult)  Goal: Skin Health and Integrity  Outcome: Ongoing (interventions implemented as appropriate)      Problem: Confusion, Acute (Adult)  Goal: Cognitive/Functional Impairments Minimized  Outcome: Ongoing (interventions implemented as appropriate)    Goal: Safety  Outcome: Ongoing (interventions implemented as appropriate)

## 2018-06-13 NOTE — PLAN OF CARE
Problem: Patient Care Overview  Goal: Plan of Care Review  Outcome: Ongoing (interventions implemented as appropriate)   06/12/18 1800   Coping/Psychosocial   Plan of Care Reviewed With patient   Plan of Care Review   Progress improving   OTHER   Outcome Summary VSS, alert and oriented to self. Very [place]. Arousing easier. Diet started with PO medication given.       Problem: Fall Risk (Adult)  Goal: Absence of Fall  Outcome: Ongoing (interventions implemented as appropriate)      Problem: Skin Injury Risk (Adult)  Goal: Skin Health and Integrity  Outcome: Ongoing (interventions implemented as appropriate)      Problem: Confusion, Acute (Adult)  Goal: Cognitive/Functional Impairments Minimized  Outcome: Ongoing (interventions implemented as appropriate)    Goal: Safety  Outcome: Ongoing (interventions implemented as appropriate)

## 2018-06-13 NOTE — PLAN OF CARE
"Problem: Patient Care Overview  Goal: Plan of Care Review  Outcome: Ongoing (interventions implemented as appropriate)   06/13/18 1024   Coping/Psychosocial   Plan of Care Reviewed With patient   Plan of Care Review   Progress no change   OTHER   Outcome Summary OT eval completed. Pt asleep, easy to arouse, however has diffculty keeping his eyes open throughout eval requiring max vc. Pt very [place] and has difficulty following commands, improves with common functional commands (i.e \"lets sit up\"). Pt able to sit at EOB w CGA to don/doff B socks w no LOB. Sit<>stand CGA. Pt is impulsive requiring repetition to slow or follow safety precautions. Skilled OT required to improve functional mobility, transfer, safety awareness, and ADL participation. OT recommnds d/c to SNF.         "

## 2018-06-13 NOTE — PLAN OF CARE
Problem: Patient Care Overview  Goal: Plan of Care Review  Outcome: Ongoing (interventions implemented as appropriate)   06/13/18 3864   Coping/Psychosocial   Plan of Care Reviewed With patient;family;daughter   Plan of Care Review   Progress improving   OTHER   Outcome Summary [place] versus [place] for NH placement. Safety maintained. Diet upgraded to regular with thin. Patient remains disoriented to time and situation at times. Patient is awake and alert throughout shift.     Goal: Individualization and Mutuality  Outcome: Ongoing (interventions implemented as appropriate)    Goal: Discharge Needs Assessment  Outcome: Ongoing (interventions implemented as appropriate)    Goal: Interprofessional Rounds/Family Conf  Outcome: Ongoing (interventions implemented as appropriate)      Problem: Fall Risk (Adult)  Goal: Absence of Fall  Outcome: Ongoing (interventions implemented as appropriate)      Problem: Skin Injury Risk (Adult)  Goal: Skin Health and Integrity  Outcome: Ongoing (interventions implemented as appropriate)      Problem: Confusion, Acute (Adult)  Goal: Cognitive/Functional Impairments Minimized  Outcome: Ongoing (interventions implemented as appropriate)    Goal: Safety  Outcome: Ongoing (interventions implemented as appropriate)

## 2018-06-13 NOTE — THERAPY DISCHARGE NOTE
Acute Care - Speech Language Pathology   Swallow Eval/Discharge [facility]     Patient Name: Cherelle Davis  : 1929  MRN: [id number]  Today's Date: 2018  Onset of Illness/Injury or Date of Surgery: 18     Referring Physician: Dr. Chisholm      Admit Date: 6/10/2018  CBSE complete. Pt presented with a full range of PO consistencies except for mechanical soft solids. Adequate oral preperation with regular solids. Mild oral residue noted post swallow of regular solids, however it was cleared with a thin liquid wash. Swallow initiation and laryngeal elevation were WNL throughout assessment. Pt presented with a strong clear vocal quality with all consistencies trialed and no overt s/s of aspiration were noted. Pt upgraded to a regular diet with thin liquids. Meds ok to be administered whole with thin liquids. SLP is signing off as services are no jim warranted.  Lukasz Lizarraga, MS CCC-SLP 2018 3:43 PM    Visit Dx:    ICD-10-CM ICD-9-CM   1. Dysphagia, unspecified type R13.10 787.20   2. Impaired mobility and ADLs Z74.09 799.89     Patient Active Problem List   Diagnosis   • Unresponsiveness     Past Medical History:   Diagnosis Date   • Anxiety    • BPH (benign prostatic hyperplasia)    • Dementia    • Depression    • GERD (gastroesophageal reflux disease)    • Hypertension    • Renal disease    • TIA (transient ischemic attack)      Past Surgical History:   Procedure Laterality Date   • CATARACT EXTRACTION     • CHOLECYSTECTOMY            SWALLOW EVALUATION (last 72 hours)      SLP Adult Swallow Evaluation     Row Name 18 1407 18 1400                Rehab Evaluation    Document Type evaluation  -CS --  -CS       Subjective Information no complaints  -CS  --       Patient Observations alert;cooperative;agree to therapy  -CS  --       Patient/Family Observations Daughter in room  -CS  --       Patient Effort adequate  -CS  --          General Information    Patient Profile  Reviewed yes  -CS  --       Pertinent History Of Current Problem Dementia  -CS  --       Current Method of Nutrition pureed;honey-thick liquids  -CS  --       Precautions/Limitations, Vision WFL  -CS  --       Precautions/Limitations, Hearing hearing impairment, bilaterally  -CS  --       Prior Level of Function-Communication WFL  -CS  --       Prior Level of Function-Swallowing no diet consistency restrictions  -CS  --       Plans/Goals Discussed with patient and family  -CS  --       Barriers to Rehab none identified  -CS  --       Patient's Goals for Discharge return home  -CS  --       Family Goals for Discharge family did not state  -CS  --          Pain Assessment    Additional Documentation Pain Scale: Numbers Pre/Post-Treatment (Group)  -CS  --          Pain Scale: Numbers Pre/Post-Treatment    Pain Scale: Numbers, Pretreatment 0/10 - no pain  -CS  --       Pain Scale: Numbers, Post-Treatment 0/10 - no pain  -CS  --          Oral Motor and Function    Dentition Assessment natural, present and adequate  -CS  --       Secretion Management WNL/WFL  -CS  --       Mucosal Quality moist, healthy  -CS  --       Volitional Swallow WFL  -CS  --       Volitional Cough WFL  -CS  --          Oral Musculature and Cranial Nerve Assessment    Oral Motor General Assessment WFL  -CS  --          General Eating/Swallowing Observations    Respiratory Support Currently in Use room air  -CS  --       Positioning During Eating upright in bed  -CS  --       Utensils Used straw  -CS  --       Consistencies Trialed regular textures;honey-thick liquids;nectar/syrup-thick liquids;thin liquids;pureed  -CS  --          Clinical Swallow Eval    Oral Prep Phase WFL  -CS  --       Oral Transit WFL  -CS  --       Oral Residue WFL  -CS  --       Pharyngeal Phase WFL  -CS  --       Clinical Swallow Evaluation Summary CBSE complete. Pt presented with a full range of PO consistencies except for mechanical soft solids. Adequate oral preperation  with regular solids. Mild oral residue noted post swallow of regular solids, however it was cleared with a thin liquid wash. Swallow initiation and laryngeal elevation were WNL throughout assessment. Pt presented with a strong clear vocal quality with all consistencies trialed and no overt s/s of aspiration were noted. Pt upgraded to a regular diet with thin liquids. Meds ok to be administered whole with thin liquids. SLP is signing off as services are no jim warranted.  -CS  --          Clinical Impression    SLP Swallowing Diagnosis functional oral phase;functional pharyngeal phase  -CS  --       Functional Impact no impact on function  -CS  --       Criteria for Skilled Therapeutic Interventions Met no problems identified which require skilled intervention  -CS  --          Recommendations    Therapy Frequency (Swallow) evaluation only  -CS  --       SLP Diet Recommendation regular textures;thin liquids  -CS  --       Recommended Precautions and Strategies upright posture during/after eating  -CS  --       SLP Rec. for Method of Medication Administration meds whole;with thin liquids  -CS  --       Monitor for Signs of Aspiration yes;notify SLP if any concerns  -CS  --       Anticipated Dischage Disposition skilled nursing facility  -CS  --         User Key  (r) = Recorded By, (t) = Taken By, (c) = Cosigned By    Initials Name Effective Dates    CS Lukasz Lizarraga MS CCC-SLP 04/03/18 -         EDUCATION  The patient has been educated in the following areas:   Dysphagia (Swallowing Impairment).    SLP Recommendation and Plan  SLP Swallowing Diagnosis: functional oral phase, functional pharyngeal phase  SLP Diet Recommendation: regular textures, thin liquids     Monitor for Signs of Aspiration: yes, notify SLP if any concerns     Criteria for Skilled Therapeutic Interventions Met: no problems identified which require skilled intervention  Anticipated Dischage Disposition: skilled nursing facility     Therapy  Frequency (Swallow): evaluation only          Plan of Care Reviewed With: patient, daughter  Progress: no change  Outcome Summary: CBSE complete. Pt presented with a full range of PO consistencies except for mechanical soft solids. Adequate oral preperation with regular solids. Mild oral residue noted post swallow of regular solids, however it was cleared with a thin liquid wash. Swallow initiation and laryngeal elevation were WNL throughout assessment. Pt presented with a strong clear vocal quality with all consistencies trialed and no overt s/s of aspiration were noted. Pt upgraded to a regular diet with thin liquids. Meds ok to be administered whole with thin liquids. SLP is signing off as services are no jim warranted.           SLP Outcome Measures (last 72 hours)      SLP Outcome Measures     Row Name 06/13/18 1407             SLP Outcome Measures    Outcome Measure Used? Adult NOMS  -CS         OTHER    SLP Diagnoses Dysphagia  -CS         FCM Scores    FCM Chosen Swallowing  -CS      Swallowing FCM Score 7  -CS        User Key  (r) = Recorded By, (t) = Taken By, (c) = Cosigned By    Initials Name Effective Dates     Lukasz Lizarraga MS CCC-SLP 04/03/18 -            Time Calculation:         Time Calculation- SLP     Row Name 06/13/18 1539             Time Calculation- SLP    SLP Start Time 1407  -      SLP Stop Time 1510  -      SLP Time Calculation (min) 63 min  -      SLP Received On 06/13/18  -        User Key  (r) = Recorded By, (t) = Taken By, (c) = Cosigned By    Initials Name Provider Type     Lukasz Lizarraga MS CCC-SLP Speech and Language Pathologist          Therapy Charges for Today     Code Description Service Date Service Provider Modifiers Qty    13731656345 HC ST SWALLOWING CURRENT STATUS 6/13/2018 Lukasz Lizarraga MS CCC-SLP GN, CH 1    69869138259 HC ST SWALLOWING PROJECTED 6/13/2018 Lukasz Lizarraga MS CCC-SLP GN, CH 1    63005128210 HC ST SWALLOWING DISCHARGE 6/13/2018 Lukasz Lizarraga MS  CCC-SLP GN,  1    [id number] HC ST EVAL ORAL PHARYNG SWALLOW 4 6/13/2018 Lukasz Lizarraga MS CCC-SLP GN, KX 1          SLP G-Codes  SLP NOMS Used?: Yes  Functional Limitations: Swallowing  Swallow Current Status (): 0 percent impaired, limited or restricted  Swallow Goal Status (): 0 percent impaired, limited or restricted  Swallow Discharge Status (): 0 percent impaired, limited or restricted    SLP Discharge Summary  Anticipated Dischage Disposition: skilled nursing facility  Reason for Discharge: other (see comments) (eval only)  Progress Toward Achieving Short/long Term Goals: other (see comments) (eval only)  Discharge Destination: other (see comments) (Pt remains in acute care.)    Lukasz Lizarraga MS CCC-SLP  6/13/2018

## 2018-06-13 NOTE — PROGRESS NOTES
Continued Stay Note   [place]     Patient Name: Cherelle Davis  MRN: [id number]  Today's Date: 6/13/2018    Admit Date: 6/10/2018          Discharge Plan     Row Name 06/13/18 1615       Plan    Plan [place] vs [place].    Patient/Family in Agreement with Plan yes    Plan Comments Ilene at [facility] is out of town so spoke with Patito [phone]. She said that she would find out from Ilene. Still had not heard from her so called again and had to leave a message.               Discharge Codes    No documentation.           HUNG Langford

## 2018-06-13 NOTE — PLAN OF CARE
Problem: Patient Care Overview  Goal: Plan of Care Review  Outcome: Ongoing (interventions implemented as appropriate)   06/13/18 6470   Coping/Psychosocial   Plan of Care Reviewed With patient;daughter   Plan of Care Review   Progress no change   OTHER   Outcome Summary CBSE complete. Pt presented with a full range of PO consistencies except for mechanical soft solids. Adequate oral preperation with regular solids. Mild oral residue noted post swallow of regular solids, however it was cleared with a thin liquid wash. Swallow initiation and laryngeal elevation were WNL throughout assessment. Pt presented with a strong clear vocal quality with all consistencies trialed and no overt s/s of aspiration were noted. Pt upgraded to a regular diet with thin liquids. Meds ok to be administered whole with thin liquids. SLP is signing off as services are no jim warranted.

## 2018-06-14 LAB
ANION GAP SERPL CALCULATED.3IONS-SCNC: 9 MMOL/L (ref 4–13)
BUN BLD-MCNC: 22 MG/DL (ref 5–21)
BUN/CREAT SERPL: 19.1 (ref 7–25)
CALCIUM SPEC-SCNC: 8.6 MG/DL (ref 8.4–10.4)
CHLORIDE SERPL-SCNC: 100 MMOL/L (ref 98–110)
CO2 SERPL-SCNC: 26 MMOL/L (ref 24–31)
CREAT BLD-MCNC: 1.15 MG/DL (ref 0.5–1.4)
GFR SERPL CREATININE-BSD FRML MDRD: 60 ML/MIN/1.73
GLUCOSE BLD-MCNC: 111 MG/DL (ref 70–100)
POTASSIUM BLD-SCNC: 4 MMOL/L (ref 3.5–5.3)
SODIUM BLD-SCNC: 135 MMOL/L (ref 135–145)

## 2018-06-14 PROCEDURE — 80048 BASIC METABOLIC PNL TOTAL CA: CPT | Performed by: INTERNAL MEDICINE

## 2018-06-14 PROCEDURE — G8978 MOBILITY CURRENT STATUS: HCPCS | Performed by: PHYSICAL THERAPIST

## 2018-06-14 PROCEDURE — 97162 PT EVAL MOD COMPLEX 30 MIN: CPT

## 2018-06-14 PROCEDURE — G8979 MOBILITY GOAL STATUS: HCPCS | Performed by: PHYSICAL THERAPIST

## 2018-06-14 PROCEDURE — 25010000002 HEPARIN (PORCINE) PER 1000 UNITS: Performed by: FAMILY MEDICINE

## 2018-06-14 PROCEDURE — 97535 SELF CARE MNGMENT TRAINING: CPT

## 2018-06-14 RX ORDER — ALPRAZOLAM 0.25 MG/1
0.25 TABLET ORAL 2 TIMES DAILY PRN
Status: DISCONTINUED | OUTPATIENT
Start: 2018-06-14 | End: 2018-06-15 | Stop reason: HOSPADM

## 2018-06-14 RX ADMIN — HEPARIN SODIUM 5000 UNITS: 5000 INJECTION INTRAVENOUS; SUBCUTANEOUS at 09:38

## 2018-06-14 RX ADMIN — HEPARIN SODIUM 5000 UNITS: 5000 INJECTION INTRAVENOUS; SUBCUTANEOUS at 19:58

## 2018-06-14 RX ADMIN — DONEPEZIL HYDROCHLORIDE 10 MG: 10 TABLET, FILM COATED ORAL at 19:58

## 2018-06-14 RX ADMIN — MEMANTINE HYDROCHLORIDE 10 MG: 5 TABLET, FILM COATED ORAL at 09:38

## 2018-06-14 RX ADMIN — CARVEDILOL 3.12 MG: 3.12 TABLET, FILM COATED ORAL at 19:58

## 2018-06-14 RX ADMIN — CARVEDILOL 3.12 MG: 3.12 TABLET, FILM COATED ORAL at 09:38

## 2018-06-14 RX ADMIN — ALPRAZOLAM 0.25 MG: 0.25 TABLET ORAL at 18:16

## 2018-06-14 RX ADMIN — FAMOTIDINE 40 MG: 20 TABLET, FILM COATED ORAL at 09:38

## 2018-06-14 NOTE — PLAN OF CARE
Problem: Patient Care Overview  Goal: Plan of Care Review  Outcome: Ongoing (interventions implemented as appropriate)   06/14/18 1510   Coping/Psychosocial   Plan of Care Reviewed With patient   Plan of Care Review   Progress improving   OTHER   Outcome Summary Pt transfers with CGA/min A. Pt min/mod A for sponge bath while supported sitting. Pt mod A for LB dressing. Pt would benefit from SNF at discharge for continued rehab prior to returning home. Continue OT POC

## 2018-06-14 NOTE — PROGRESS NOTES
Continued Stay Note  [facility]     Patient Name: Cherelle Davis  MRN: [id number]  Today's Date: 6/14/2018    Admit Date: 6/10/2018          Discharge Plan     Row Name 06/14/18 1257       Plan    Plan [facility] - Pending PASSR    Patient/Family in Agreement with Plan yes    Plan Comments Ilene at [facility] has called to confirm patient has been accepted. Ilene will be starting on patient's PASSR today (d/t a previous stay in a geriatric psych facility). Will follow for PASSR approval.     Ilene - Cell [phone]              Discharge Codes    No documentation.           PARVEZ Newell

## 2018-06-14 NOTE — PROGRESS NOTES
Continued Stay Note  [facility]     Patient Name: Cherelle Davis  MRN: [id number]  Today's Date: 6/14/2018    Admit Date: 6/10/2018          Discharge Plan     Row Name 06/14/18 0947       Plan    Plan [facility] vs [place]    Patient/Family in Agreement with Plan yes    Plan Comments [facility] spoke to Ilene in admissions at [facility]. Ilene says patient will likely be accepted but she will call [facility] back with official decision. Will follow for bed offer.            PONCHO NewellW

## 2018-06-14 NOTE — THERAPY EVALUATION
Acute Care - Physical Therapy Initial Evaluation  [facility]     Patient Name: Cherelle Davis  : 1929  MRN: [id number]  Today's Date: 2018   Onset of Illness/Injury or Date of Surgery: 18  Date of Referral to PT: 18  Referring Physician: Richard Chisholm MD      Admit Date: 6/10/2018    Visit Dx:     ICD-10-CM ICD-9-CM   1. Dysphagia, unspecified type R13.10 787.20   2. Impaired mobility and ADLs Z74.09 799.89   3. Decreased strength, endurance, and mobility R53.1 780.79    Z74.09 780.99     Patient Active Problem List   Diagnosis   • Unresponsiveness     Past Medical History:   Diagnosis Date   • Anxiety    • BPH (benign prostatic hyperplasia)    • Dementia    • Depression    • GERD (gastroesophageal reflux disease)    • Hypertension    • Renal disease    • TIA (transient ischemic attack)      Past Surgical History:   Procedure Laterality Date   • CATARACT EXTRACTION     • CHOLECYSTECTOMY          PT ASSESSMENT (last 12 hours)      Physical Therapy Evaluation     Row Name 18 1306          PT Evaluation Time/Intention    Subjective Information no complaints  -MS (r) KH (t) MS (c)     Document Type evaluation  -MS (r) KH (t) MS (c)     Mode of Treatment physical therapy  -MS (r) KH (t) MS (c)     Patient Effort good  -MS (r) KH (t) MS (c)     Symptoms Noted During/After Treatment fatigue;shortness of breath  -MS (r) KH (t) MS (c)     Row Name 18 1301          General Information    Patient Profile Reviewed? yes  -MS (r) KH (t) MS (c)     Onset of Illness/Injury or Date of Surgery 18  -MS (r) KH (t) MS (c)     Referring Physician Richard Chisholm MD  -MS (r) KH (t) MS (c)     Patient Observations alert;cooperative;agree to therapy  -MS (r) KH (t) MS (c)     Patient/Family Observations patient's daughter and son in the room  -MS (r) KH (t) MS (c)     General Observations of Patient Patient in [employer] eating; carranza; IV; telemetry; SCD's  -MS (r) KH (t) MS  (c)     Prior Level of Function --   Patient states he can not remember PLOF at [facility]  -MS (r) KH (t) MS (c)     Equipment Currently Used at Home --   Patient states he can not remember PLOF at [facility]  -MS (r) KH (t) MS (c)     Pertinent History of Current Functional Problem Hx: BPH; HTN; renal disease; TIA; dementia; hypoatremia; aniexty; depression; Dx: unresponsiveness  -MS (r) KH (t) MS (c)     Existing Precautions/Restrictions fall  -MS (r) KH (t) MS (c)     Limitations/Impairments hearing;safety/cognitive  -MS (r) KH (t) MS (c)     Risks Reviewed patient and family:;LOB;nausea/vomiting;dizziness;increased discomfort;change in vital signs;lines disloged  -MS (r) KH (t) MS (c)     Benefits Reviewed patient and family:;improve function;increase independence;increase strength;increase balance;increase knowledge  -MS (r) KH (t) MS (c)     Barriers to Rehab cognitive status;hearing deficit  -MS (r) KH (t) MS (c)     Row Name 06/14/18 1306          Relationship/Environment    Primary Source of Support/Comfort child(meghna)  -MS (r) KH (t) MS (c)     Name of Support/Comfort Primary Source Daughter Pebbles and son Jose  -MS (r) KH (t) MS (c)     Lives With facility resident  -MS (r) KH (t) MS (c)     Name(s) of Who Lives With Patient pt was residing at [facility] in [place], [place] prior  -MS (r) KH (t) MS (c)     Family Caregiver if Needed child(meghna), adult  -MS (r) KH (t) MS (c)     Row Name 06/14/18 1306          Resource/Environmental Concerns    Current Living Arrangements extended care facility  -MS (r) KH (t) MS (c)     Row Name 06/14/18 1306          Cognitive Assessment/Interventions    Additional Documentation Cognitive Assessment/Intervention (Group)  -MS (r) KH (t) MS (c)     Row Name 06/14/18 1306          Cognitive Assessment/Intervention- PT/OT    Affect/Mental Status (Cognitive) --  -MS (r) KH (t) MS (c)     Orientation Status (Cognition) oriented to;person;time  -MS (r) KH (t) MS (c)     Follows  Commands (Cognition) follows one step commands;50-74% accuracy;delayed response/completion;increased processing time needed;repetition of directions required  -MS (r) KH (t) MS (c)     Safety Deficit (Cognitive) mild deficit;ability to follow commands;awareness of need for assistance;insight into deficits/self awareness  -MS (r) KH (t) MS (c)     Personal Safety Interventions fall prevention program maintained;gait belt;muscle strengthening facilitated;nonskid shoes/slippers when out of bed;supervised activity  -MS (r) KH (t) MS (c)     Cognitive Assessment/Intervention Comment patient has hearing deficit and demonstrates mild deficit with commands; orients to himself but can no recall past events with PLOF at [facility]; keeps repeating statements to PT redundantly without recall previously stating it  -MS (r) KH (t) MS (c)     Row Name 06/14/18 1306          Safety Issues, Functional Mobility    Safety Issues Affecting Function (Mobility) ability to follow commands;insight into deficits/self awareness  -MS (r) KH (t) MS (c)     Impairments Affecting Function (Mobility) balance;cognition;endurance/activity tolerance;postural/trunk control;shortness of breath;strength  -MS (r) KH (t) MS (c)     Row Name 06/14/18 1306          Bed Mobility Assessment/Treatment    Bed Mobility Assessment/Treatment rolling left;sidelying-sit;scooting/bridging;sit-supine  -MS (r) KH (t) MS (c)     Rolling Left [place] (Bed Mobility) verbal cues;nonverbal cues (demo/gesture);contact guard  -MS (r) KH (t) MS (c)     Scooting/Bridging [place] (Bed Mobility) minimum assist (75% patient effort);2 person assist  -MS (r) KH (t) MS (c)     Sit-Supine [place] (Bed Mobility) verbal cues;nonverbal cues (demo/gesture);contact guard  -MS (r) KH (t) MS (c)     Sidelying-Sit [place] (Bed Mobility) verbal cues;nonverbal cues (demo/gesture);minimum assist (75% patient effort);1 person assist  -MS (r) KH (t) MS (c)     Bed Mobility,  Safety Issues cognitive deficits limit understanding  -MS (r) KH (t) MS (c)     Assistive Device (Bed Mobility) bed rails;draw sheet;head of bed elevated  -MS (r) KH (t) MS (c)     Comment (Bed Mobility) patient was contact guard with sit to supine; min A with sidelying to sit due to cognition and understanding of commands  -MS (r) KH (t) MS (c)     Row Name 06/14/18 1306          Transfer Assessment/Treatment    Transfer Assessment/Treatment sit-stand transfer;stand-sit transfer  -MS (r) KH (t) MS (c)     Sit-Stand [place] (Transfers) minimum assist (75% patient effort)  -MS (r) KH (t) MS (c)     Stand-Sit [place] (Transfers) contact guard  -MS (r) KH (t) MS (c)     Row Name 06/14/18 1306          Gait/Stairs Assessment/Training    Comment (Gait/Stairs) Deferred due to patient safety; unsteady on feet with weight shifting and taking one step forward  -MS (r) KH (t) MS (c)     Row Name 06/14/18 1306          General ROM    GENERAL ROM COMMENTS BUE and BLE AROM WFL  -MS (r) KH (t) MS (c)     Row Name 06/14/18 1306          General Assessment (Manual Muscle Testing)    Comment, General Manual Muscle Testing (MMT) Assessment BUE 4+/5 and BLE 4-/5  -MS (r) KH (t) MS (c)     Row Name 06/14/18 1306          Balance    Balance static sitting balance;static standing balance;dynamic sitting balance;dynamic standing balance  -MS (r) KH (t) MS (c)     Row Name 06/14/18 1306          Static Sitting Balance    Level of [place] (Unsupported Sitting, Static Balance) contact guard assist  -MS (r) KH (t) MS (c)     Sitting Position (Unsupported Sitting, Static Balance) sitting on edge of bed  -MS (r) KH (t) MS (c)     Row Name 06/14/18 1306          Static Standing Balance    Level of [place] (Supported Standing, Static Balance) minimal assist, 75% patient effort  -MS (r) KH (t) MS (c)     Row Name 06/14/18 1306          Dynamic Standing Balance    Level of [place], Reaches Outside Midline (Standing, Dynamic  Balance) minimal assist, 75% patient effort  -MS (r) KH (t) MS (c)     Comment, Reaches Outside Midline (Standing, Dynamic Balance) patient very unsteady with weight shifting to the right and left; along with taking a step forward and one backward  -MS (r) KH (t) MS (c)     Row Name 06/14/18 1306          Sensory Assessment/Intervention    Sensory General Assessment no sensation deficits identified  -MS (r) KH (t) MS (c)     Row Name 06/14/18 1306          Pain Assessment    Additional Documentation Pain Scale: Numbers Pre/Post-Treatment (Group)  -MS (r) KH (t) MS (c)     Row Name 06/14/18 1306          Pain Scale: Numbers Pre/Post-Treatment    Pain Scale: Numbers, Pretreatment 0/10 - no pain  -MS (r) KH (t) MS (c)     Pain Scale: Numbers, Post-Treatment 0/10 - no pain  -MS (r) KH (t) MS (c)     Row Name 06/14/18 1306          Coping    Observed Emotional State calm;cooperative  -MS (r) KH (t) MS (c)     Verbalized Emotional State acceptance  -MS (r) KH (t) MS (c)     Row Name 06/14/18 1306          Plan of Care Review    Plan of Care Reviewed With patient;family  -MS (r) KH (t) MS (c)     Row Name 06/14/18 1306          Physical Therapy Clinical Impression    Date of Referral to PT 06/12/18  -MS (r) KH (t) MS (c)     Patient/Family Goals Statement (PT Clinical Impression) Family states goal is to d/c to SNF in [place]  -MS (r) KH (t) MS (c)     Criteria for Skilled Interventions Met (PT Clinical Impression) yes;treatment indicated  -MS (r) KH (t) MS (c)     Pathology/Pathophysiology Noted (Describe Specifically for Each System) musculoskeletal  -MS (r) KH (t) MS (c)     Impairments Found (describe specific impairments) aerobic capacity/endurance;arousal, attention, and cognition;gait, locomotion, and balance;muscle performance;posture  -MS (r) KH (t) MS (c)     Rehab Potential (PT Clinical Summary) fair, will monitor progress closely  -MS (r) KH (t) MS (c)     Predicted Duration of Therapy (PT) until time of  discharge  -MS (r) KH (t) MS (c)     Care Plan Review (PT) evaluation/treatment results reviewed;care plan/treatment goals reviewed;risks/benefits reviewed;current/potential barriers reviewed;patient/other agree to care plan  -MS (r) KH (t) MS (c)     Care Plan Review, Other Participant (PT Clinical Impression) daughter;son  -MS (r) KH (t) MS (c)     Row Name 06/14/18 1306          Physical Therapy Goals    Bed Mobility Goal Selection (PT) bed mobility, PT goal 1;bed mobility, PT goal 2  -MS (r) KH (t) MS (c)     Transfer Goal Selection (PT) transfer, PT goal 1  -MS (r) KH (t) MS (c)     Gait Training Goal Selection (PT) gait training, PT goal 1  -MS (r) KH (t) MS (c)     Balance Goal Selection (PT) balance, PT goal 1  -MS (r) KH (t) MS (c)     Additional Documentation Balance Goal Selection (PT) (Row)  -MS (r) KH (t) MS (c)     Row Name 06/14/18 1300          Bed Mobility Goal 1 (PT)    Activity/Assistive Device (Bed Mobility Goal 1, PT) sit to supine;supine to sit  -MS (r) KH (t) MS (c)     [place] Level/Cues Needed (Bed Mobility Goal 1, PT) independent  -MS (r) KH (t) MS (c)     Time Frame (Bed Mobility Goal 1, PT) long term goal (LTG)  -MS (r) KH (t) MS (c)     Barriers (Bed Mobility Goal 1, PT) hearing and cognition  -MS (r) KH (t) MS (c)     Progress/Outcomes (Bed Mobility Goal 1, PT) goal ongoing  -MS (r) KH (t) MS (c)     Row Name 06/14/18 1307          Bed Mobility Goal 2 (PT)    Activity/Assistive Device (Bed Mobility Goal 2, PT) bridging;scooting;rolling to left;rolling to right  -MS (r) KH (t) MS (c)     [place] Level/Cues Needed (Bed Mobility Goal 2, PT) independent  -MS (r) KH (t) MS (c)     Time Frame (Bed Mobility Goal 2, PT) long term goal (LTG)  -MS (r) KH (t) MS (c)     Barriers (Bed Mobility Goal 2, PT) hearing and cognition  -MS (r) KH (t) MS (c)     Progress/Outcomes (Bed Mobility Goal 2, PT) goal ongoing  -MS (r) KH (t) MS (c)     Row Name 06/14/18 1306          Transfer Goal 1 (PT)     Activity/Assistive Device (Transfer Goal 1, PT) sit-to-stand/stand-to-sit;bed-to-chair/chair-to-bed  -MS (r) KH (t) MS (c)     [place] Level/Cues Needed (Transfer Goal 1, PT) contact guard assist  -MS (r) KH (t) MS (c)     Time Frame (Transfer Goal 1, PT) long term goal (LTG)  -MS (r) KH (t) MS (c)     Barriers (Transfers Goal 1, PT) hearing and cognition  -MS (r) KH (t) MS (c)     Progress/Outcome (Transfer Goal 1, PT) goal ongoing  -MS (r) KH (t) MS (c)     Row Name 06/14/18 1304          Gait Training Goal 1 (PT)    Activity/Assistive Device (Gait Training Goal 1, PT) gait (walking locomotion);assistive device use;decrease fall risk;forward stepping;improve balance and speed;increase endurance/gait distance;normalize weight shifts;walker, rolling  -MS (r) KH (t) MS (c)     [place] Level (Gait Training Goal 1, PT) moderate assist (50-74% patient effort)  -MS (r) KH (t) MS (c)     Distance (Gait Goal 1, PT) 15 feet  -MS (r) KH (t) MS (c)     Time Frame (Gait Training Goal 1, PT) long term goal (LTG)  -MS (r) KH (t) MS (c)     Barriers (Gait Training Goal 1, PT) hearing and cognition  -MS (r) KH (t) MS (c)     Progress/Outcome (Gait Training Goal 1, PT) goal ongoing  -MS (r) KH (t) MS (c)     Row Name 06/14/18 1306          Balance Goal 1 (PT)    Activity/Assistive Device (Balance Goal 1, PT) walker, rolling;other (see comments)   weight shifting and multidirectional steps  -MS (r) KH (t) MS (c)     [place] Level/Cues Needed (Balance Goal 1, PT) contact guard assist  -MS (r) KH (t) MS (c)     Time Frame (Balance Goal 1, PT) long term goal (LTG)  -MS (r) KH (t) MS (c)     Barriers (Balance Goal 1, PT) hearing and cognition  -MS (r) KH (t) MS (c)     Progress/Outcomes (Balance Goal 1, PT) goal ongoing  -MS (r) KH (t) MS (c)     Row Name 06/14/18 1306          Positioning and Restraints    Pre-Treatment Position in bed  -MS (r) KH (t) MS (c)     Post Treatment Position bed  -MS (r) KH (t) MS (c)      In Bed fowlers;call light within reach;encouraged to call for assist;exit alarm on;with family/caregiver;side rails up x2;SCD pump applied  -MS (r) KH (t) MS (c)       User Key  (r) = Recorded By, (t) = Taken By, (c) = Cosigned By    Initials Name Provider Type    MS Nikki PARADA Leonid, PT DPT Physical Therapist    EDWIN Mercedes, PT Student PT Student          Physical Therapy Education     Title: PT OT SLP Therapies (Done)     Topic: Physical Therapy (Done)     Point: Mobility training (Done)    Learning Progress Summary     Learner Status Readiness Method Response Comment Documented by    Patient Done Acceptance E VU,NR Patient was educated on use and proper mechanicas of BUE and BLE for bed mobility and transfers. Patient verbalized understanding but was confused with demonstration of techniques.  06/14/18 1418    Family Done Acceptance E VU,NR Patient was educated on use and proper mechanicas of BUE and BLE for bed mobility and transfers. Patient verbalized understanding but was confused with demonstration of techniques.  06/14/18 1418          Point: Home exercise program (Done)    Learning Progress Summary     Learner Status Readiness Method Response Comment Documented by    Patient Done Acceptance E VU,NR Patient was educated on use and proper mechanicas of BUE and BLE for bed mobility and transfers. Patient verbalized understanding but was confused with demonstration of techniques.  06/14/18 1418    Family Done Acceptance E VU,NR Patient was educated on use and proper mechanicas of BUE and BLE for bed mobility and transfers. Patient verbalized understanding but was confused with demonstration of techniques.  06/14/18 1418          Point: Body mechanics (Done)    Learning Progress Summary     Learner Status Readiness Method Response Comment Documented by    Patient Done Acceptance E VU,NR Patient was educated on use and proper mechanicas of BUE and BLE for bed mobility and transfers. Patient  verbalized understanding but was confused with demonstration of techniques.  06/14/18 1418    Family Done Acceptance E VU,NR Patient was educated on use and proper mechanicas of BUE and BLE for bed mobility and transfers. Patient verbalized understanding but was confused with demonstration of techniques.  06/14/18 1418          Point: Precautions (Done)    Learning Progress Summary     Learner Status Readiness Method Response Comment Documented by    Patient Done Acceptance E VU,NR Patient was educated on use and proper mechanicas of BUE and BLE for bed mobility and transfers. Patient verbalized understanding but was confused with demonstration of techniques.  06/14/18 1418    Family Done Acceptance E VU,NR Patient was educated on use and proper mechanicas of BUE and BLE for bed mobility and transfers. Patient verbalized understanding but was confused with demonstration of techniques.  06/14/18 1418                      User Key     Initials Effective Dates Name Provider Type Discipline     04/05/18 -  Gena Mercedes, PT Student PT Student PT                PT Recommendation and Plan  Anticipated Discharge Disposition (PT): skilled nursing facility  Planned Therapy Interventions (PT Eval): balance training, bed mobility training, gait training, home exercise program, patient/family education, postural re-education, strengthening  Therapy Frequency (PT Clinical Impression): 2 times/day  Outcome Summary/Treatment Plan (PT)  Anticipated Discharge Disposition (PT): skilled nursing facility  Plan of Care Reviewed With: patient, daughter, son  Progress: no change  Outcome Summary: Patient is alert;oriented to self and time. He does not recall PLOF or DME used at [facility] in [place]. Patient's family states he was unresponsive for 4 days due to medication given. Patient has difficulty at times following commands due to hearing and cognitive deficits. He restates himself redundantly without recall of making the  statement before. Patient demonstrates bed mobility with CG with delayed understanding of commands to do so. Patient is very unsteady on his feet with weight shifting in standing and taking small steps forward. Physical therapy is recommended to increase overall strength in extremities, increase bed mobility independence, balance, and ambulatio. D/c upon time of patient leaving is recommended to a SNF.           Outcome Measures     Row Name 06/14/18 1306 06/14/18 1300 06/13/18 1000       How much help from another person do you currently need...    Turning from your back to your side while in flat bed without using bedrails? 3  -MS (r) KH (t) MS (c) --  -MS (r) KH (t) MS (c)  --    Moving from lying on back to sitting on the side of a flat bed without bedrails? 3  -MS (r) KH (t) MS (c) --  -MS (r) KH (t) MS (c)  --    Moving to and from a bed to a chair (including a wheelchair)? 2  -MS (r) KH (t) MS (c) --  -MS (r) KH (t) MS (c)  --    Standing up from a chair using your arms (e.g., wheelchair, bedside chair)? 3  -MS (r) KH (t) MS (c) --  -MS (r) KH (t) MS (c)  --    Climbing 3-5 steps with a railing? 1  -MS (r) KH (t) MS (c) --  -MS (r) KH (t) MS (c)  --    To walk in hospital room? 1  -MS (r) KH (t) MS (c) --  -MS (r) KH (t) MS (c)  --    AM-PAC 6 Clicks Score 13  -MS (r) KH (t) --  -MS (r) KH (t)  --       How much help from another is currently needed...    Putting on and taking off regular lower body clothing?  --  -- 3  -MK    Bathing (including washing, rinsing, and drying)  --  -- 2  -MK    Toileting (which includes using toilet bed pan or urinal)  --  -- 2  -MK    Putting on and taking off regular upper body clothing  --  -- 3  -MK    Taking care of personal grooming (such as brushing teeth)  --  -- 3  -MK    Eating meals  --  -- 3  -MK    Score  --  -- 16  -MK       Functional Assessment    Outcome Measure Options AM-PAC 6 Clicks Basic Mobility (PT)  -MS (r) KH (t) MS (c) --  -MS (r) KH (t) MS (c) AM-PAC  6 Clicks Daily Activity (OT)  -MK      User Key  (r) = Recorded By, (t) = Taken By, (c) = Cosigned By    Initials Name Provider Type    MS Nikki PARADA Leonid, PT DPT Physical Therapist    DINESH Duncan, OT Occupational Therapist    EDWIN Mercedes, PT Student PT Student           Time Calculation:         PT Charges     Row Name 06/14/18 1352             Time Calculation    Start Time 1306  -MS (r) KH (t) MS (c)      Stop Time 1333   10 minute chart review  -MS (r) KH (t) MS (c)      Time Calculation (min) 27 min  -MS (r) KH (t)      PT Received On 06/14/18  -MS (r) KH (t) MS (c)      PT Goal Re-Cert Due Date 06/24/18  -MS (r) KH (t) MS (c)        User Key  (r) = Recorded By, (t) = Taken By, (c) = Cosigned By    Initials Name Provider Type    MS Nikki PARADA Leonid, PT DPT Physical Therapist    EDWIN Mercedes, PT Student PT Student        Therapy Suggested Charges     Code   Minutes Charges    None           Therapy Charges for Today     Code Description Service Date Service Provider Modifiers Qty    60180893928 HC PT EVAL MOD COMPLEXITY 2 6/14/2018 Gena Mercedes, PT Student GP, KX 1          PT G-Codes  Outcome Measure Options: AM-PAC 6 Clicks Basic Mobility (PT)  Score: 13  Functional Limitation: Mobility: Walking and moving around  Mobility: Walking and Moving Around Current Status (): At least 40 percent but less than 60 percent impaired, limited or restricted  Mobility: Walking and Moving Around Goal Status (): At least 20 percent but less than 40 percent impaired, limited or restricted      Gena Mercedes PT Student  6/14/2018

## 2018-06-14 NOTE — PLAN OF CARE
Problem: Patient Care Overview  Goal: Plan of Care Review  Outcome: Ongoing (interventions implemented as appropriate)   06/14/18 6150   Coping/Psychosocial   Plan of Care Reviewed With patient;daughter;son   Plan of Care Review   Progress no change   OTHER   Outcome Summary Patient is alert;oriented to self and time. He does not recall PLOF or DME used at [facility] in [place]. Patient's family states he was unresponsive for 4 days due to medication given. Patient has difficulty at times following commands due to hearing and cognitive deficits. He restates himself redundantly without recall of making the statement before. Patient demonstrates bed mobility with CG with delayed understanding of commands to do so. Patient is very unsteady on his feet with weight shifting in standing and taking small steps forward. Physical therapy is recommended to increase overall strength in extremities, increase bed mobility independence, balance, and ambulatio. D/c upon time of patient leaving is recommended to a SNF.

## 2018-06-14 NOTE — THERAPY TREATMENT NOTE
Acute Care - Occupational Therapy Treatment Note  [facility]     Patient Name: Cherelle Davis  : 1929  MRN: [id number]  Today's Date: 2018  Onset of Illness/Injury or Date of Surgery: 18  Date of Referral to OT: 18  Referring Physician: iRchard Chisholm MD    Admit Date: 6/10/2018       ICD-10-CM ICD-9-CM   1. Dysphagia, unspecified type R13.10 787.20   2. Impaired mobility and ADLs Z74.09 799.89   3. Decreased strength, endurance, and mobility R53.1 780.79    Z74.09 780.99     Patient Active Problem List   Diagnosis   • Unresponsiveness     Past Medical History:   Diagnosis Date   • Anxiety    • BPH (benign prostatic hyperplasia)    • Dementia    • Depression    • GERD (gastroesophageal reflux disease)    • Hypertension    • Renal disease    • TIA (transient ischemic attack)      Past Surgical History:   Procedure Laterality Date   • CATARACT EXTRACTION     • CHOLECYSTECTOMY         Therapy Treatment          Rehabilitation Treatment Summary     Row Name 18 1402             Treatment Time/Intention    Discipline occupational therapy assistant  -TS      Document Type therapy note (daily note)  -TS      Subjective Information complains of;weakness;fatigue  -TS2      Patient Effort good  -TS2      Existing Precautions/Restrictions fall  -TS2      Treatment Considerations/Comments very [place]  -TS2      Recorded by [TS] MERNA Cannon/L 18 1418  [TS2] JENNIE CannonA/L 18 1508      Row Name 18 1402             Cognitive Assessment/Intervention- PT/OT    Personal Safety Interventions fall prevention program maintained;gait belt;nonskid shoes/slippers when out of bed  -TS      Recorded by [TS] JENNIE CannonA/L 18 1508      Row Name 18 1402             Bed Mobility Assessment/Treatment    Bed Mobility Assessment/Treatment supine-sit;sit-supine;scooting/bridging  -TS      Scooting/Bridging [place] (Bed Mobility) --    SBA  -TS      Supine-Sit [place] (Bed Mobility) minimum assist (75% patient effort)  -TS      Sit-Supine [place] (Bed Mobility) contact guard  -TS      Assistive Device (Bed Mobility) bed rails;head of bed elevated  -TS      Recorded by [TS] MERNA Cannon/L 06/14/18 1508      Row Name 06/14/18 1402             Transfer Assessment/Treatment    Transfer Assessment/Treatment sit-stand transfer;stand-sit transfer;toilet transfer  -TS      Sit-Stand [place] (Transfers) minimum assist (75% patient effort)  -TS      Stand-Sit [place] (Transfers) contact guard  -TS      [place] Level (Toilet Transfer) minimum assist (75% patient effort)  -TS      Assistive Device (Toilet Transfer) commode, bedside without drop arms  -TS      Recorded by [TS] MERNA Cannon/L 06/14/18 1508      Row Name 06/14/18 1402             Toilet Transfer    Type (Toilet Transfer) sit-stand;stand-sit  -TS      Recorded by [TS] MERNA Cannon/L 06/14/18 1508      Row Name 06/14/18 1402             ADL Assessment/Intervention    87226 - OT Self Care/Mgmt Minutes 40  -TS      BADL Assessment/Intervention upper body dressing;lower body dressing;bathing;toileting  -TS      Recorded by [TS] MERNA Cannon/L 06/14/18 1508      Row Name 06/14/18 1402             Bathing Assessment/Intervention    Bathing [place] Level set up;minimum assist (75% patient effort);verbal cues  -TS      Bathing Position supported sitting;supported standing  -TS      Recorded by [TS] MERNA Cannon/L 06/14/18 1508      Row Name 06/14/18 1402             Upper Body Dressing Assessment/Training    Upper Body Dressing [place] Level don;doff;minimum assist (75% patient effort)   gown  -TS      Upper Body Dressing Position supported sitting  -TS      Recorded by [TS] MERNA Cannon/L 06/14/18 1508      Row Name 06/14/18 1402             Lower Body Dressing Assessment/Training    Lower  Body Dressing [place] Level don;undergarment;moderate assist (50% patient effort)  -TS      Lower Body Dressing Position supported sitting;supported standing  -TS      Recorded by [TS] MERNA Cannon/L 06/14/18 1508      Row Name 06/14/18 1402             Toileting Assessment/Training    [place] Level (Toileting) perform perineal hygiene;maximum assist (25% patient effort)  -TS      Assistive Devices (Toileting) commode, bedside without drop arms  -TS      Toileting Position supported sitting;supported standing  -TS      Recorded by [TS] MERNA Cannon/L 06/14/18 1508      Row Name 06/14/18 1402             Positioning and Restraints    Pre-Treatment Position in bed  -TS      Post Treatment Position bed  -TS      In Bed fowlers;call light within reach;encouraged to call for assist;side rails up x2  -TS      Recorded by [TS] MERNA Cannon/L 06/14/18 1508      Row Name 06/14/18 1402             Pain Scale: Numbers Pre/Post-Treatment    Pain Scale: Numbers, Pretreatment 0/10 - no pain  -TS      Recorded by [TS] MERNA Cannon/L 06/14/18 1508      Row Name 06/14/18 1402             Outcome Summary/Treatment Plan (OT)    Daily Summary of Progress (OT) progress toward functional goals is good  -TS      Recorded by [TS] MERNA aCnnon/JAZZY 06/14/18 1508        User Key  (r) = Recorded By, (t) = Taken By, (c) = Cosigned By    Initials Name Effective Dates Discipline    TS JENNIE CannonA/L 08/02/16 -  OT               Occupational Therapy Education     Title: PT OT SLP Therapies (Done)     Topic: Occupational Therapy (Done)     Point: ADL training (Done)     Description: Instruct learner(s) on proper safety adaptation and remediation techniques during self care or transfers.   Instruct in proper use of assistive devices.   Learning Progress Summary     Learner Status Readiness Method Response Comment Documented by    Patient Done Acceptance E NR,VU  benefit of OT, benefit of increased activity, OT POC  06/13/18 1023    Family Done Acceptance E NR,VU benefit of OT, benefit of increased activity, OT POC  06/13/18 1023                      User Key     Initials Effective Dates Name Provider Type Discipline     05/09/18 -  Prema Duncan OT Occupational Therapist OT                OT Recommendation and Plan  Outcome Summary/Treatment Plan (OT)  Daily Summary of Progress (OT): progress toward functional goals is good  Daily Summary of Progress (OT): progress toward functional goals is good  Plan of Care Review  Plan of Care Reviewed With: patient  Plan of Care Reviewed With: patient  Outcome Summary: Pt transfers with CGA/min A. Pt min/mod A for sponge bath while supported sitting. Pt mod A for LB dressing. Pt would benefit from SNF at discharge for continued rehab prior to returning home. Continue OT POC         Outcome Measures     Row Name 06/14/18 1500 06/14/18 1306 06/14/18 1300       How much help from another person do you currently need...    Turning from your back to your side while in flat bed without using bedrails?  -- 3  -MS (r) KH (t) MS (c) --  -MS (r) KH (t) MS (c)    Moving from lying on back to sitting on the side of a flat bed without bedrails?  -- 3  -MS (r) KH (t) MS (c) --  -MS (r) KH (t) MS (c)    Moving to and from a bed to a chair (including a wheelchair)?  -- 2  -MS (r) KH (t) MS (c) --  -MS (r) KH (t) MS (c)    Standing up from a chair using your arms (e.g., wheelchair, bedside chair)?  -- 3  -MS (r) KH (t) MS (c) --  -MS (r) KH (t) MS (c)    Climbing 3-5 steps with a railing?  -- 1  -MS (r) KH (t) MS (c) --  -MS (r) KH (t) MS (c)    To walk in hospital room?  -- 1  -MS (r) KH (t) MS (c) --  -MS (r) KH (t) MS (c)    AM-PAC 6 Clicks Score  -- 13  -MS (r) KH (t) --  -MS (r) KH (t)       How much help from another is currently needed...    Putting on and taking off regular lower body clothing? 2  -TS  --  --    Bathing (including washing,  rinsing, and drying) 2  -TS  --  --    Toileting (which includes using toilet bed pan or urinal) 3  -TS  --  --    Putting on and taking off regular upper body clothing 3  -TS  --  --    Taking care of personal grooming (such as brushing teeth) 3  -TS  --  --    Eating meals 4  -TS  --  --    Score 17  -TS  --  --       Functional Assessment    Outcome Measure Options AM-PAC 6 Clicks Daily Activity (OT)  -TS AM-PAC 6 Clicks Basic Mobility (PT)  -MS (r) KH (t) MS (c) --  -MS (r) KH (t) MS (c)    Row Name 06/13/18 1000             How much help from another is currently needed...    Putting on and taking off regular lower body clothing? 3  -MK      Bathing (including washing, rinsing, and drying) 2  -MK      Toileting (which includes using toilet bed pan or urinal) 2  -MK      Putting on and taking off regular upper body clothing 3  -MK      Taking care of personal grooming (such as brushing teeth) 3  -MK      Eating meals 3  -MK      Score 16  -MK         Functional Assessment    Outcome Measure Options AM-PAC 6 Clicks Daily Activity (OT)  -MK        User Key  (r) = Recorded By, (t) = Taken By, (c) = Cosigned By    Initials Name Provider Type     MERNA Cannon/L Occupational Therapy Assistant    MS Nikki Jaquez, PT DPT Physical Therapist    DINESH Duncan, OT Occupational Therapist    EDWIN Mercedes, PT Student PT Student           Time Calculation:         Time Calculation- OT     Row Name 06/14/18 1512 06/14/18 1402          Time Calculation- OT    OT Start Time 1402  -TS  --     OT Stop Time 1442  -TS  --     OT Time Calculation (min) 40 min  -TS  --     Total Timed Code Minutes- OT 40 minute(s)  -TS  --     OT Received On 06/14/18  -TS  --        Timed Charges    23065 - OT Self Care/Mgmt Minutes  -- 40  -TS       User Key  (r) = Recorded By, (t) = Taken By, (c) = Cosigned By    Initials Name Provider Type    MERNA Stephens/L Occupational Therapy Assistant           Therapy  Suggested Charges     Code   Minutes Charges    01824 (CPT®) Hc Ot Neuromusc Re Education Ea 15 Min      76441 (CPT®) Hc Ot Ther Proc Ea 15 Min      52802 (CPT®) Hc Gait Training Ea 15 Min      06604 (CPT®) Hc Ot Therapeutic Act Ea 15 Min      80936 (CPT®) Hc Ot Manual Therapy Ea 15 Min      60993 (CPT®) Hc Ot Iontophoresis Ea 15 Min      02686 (CPT®) Hc Ot Elec Stim Ea-Per 15 Min      07554 (CPT®) Hc Ot Ultrasound Ea 15 Min      37544 (CPT®) Hc Ot Self Care/Mgmt/Train Ea 15 Min 40 3    Total  40 3        Therapy Charges for Today     Code Description Service Date Service Provider Modifiers Qty    [id number] HC OT SELF CARE/MGMT/TRAIN EA 15 MIN 6/14/2018 MERNA Cannon/L GO, KX 3          OT G-codes  OT Professional Judgement Used?: Yes  OT Functional Scales Options: AM-PAC 6 Clicks Daily Activity (OT)  Score: 16  Functional Limitation: Self care  Self Care Current Status (): At least 40 percent but less than 60 percent impaired, limited or restricted  Self Care Goal Status (): At least 20 percent but less than 40 percent impaired, limited or restricted    MADELINE Burnette  6/14/2018

## 2018-06-14 NOTE — PLAN OF CARE
Problem: Patient Care Overview  Goal: Plan of Care Review  Outcome: Ongoing (interventions implemented as appropriate)   06/14/18 6000   Coping/Psychosocial   Plan of Care Reviewed With patient   Plan of Care Review   Progress no change   OTHER   Outcome Summary VSS, safety maintained, no neuro changes, pt disoriented to time and situation, carranza cath in place, cont pulse ox, room air, up X2 to bedside commode, will continue to monitor     Goal: Individualization and Mutuality  Outcome: Ongoing (interventions implemented as appropriate)    Goal: Discharge Needs Assessment  Outcome: Ongoing (interventions implemented as appropriate)    Goal: Interprofessional Rounds/Family Conf  Outcome: Ongoing (interventions implemented as appropriate)      Problem: Fall Risk (Adult)  Goal: Absence of Fall  Outcome: Ongoing (interventions implemented as appropriate)      Problem: Skin Injury Risk (Adult)  Goal: Skin Health and Integrity  Outcome: Ongoing (interventions implemented as appropriate)      Problem: Confusion, Acute (Adult)  Goal: Cognitive/Functional Impairments Minimized  Outcome: Ongoing (interventions implemented as appropriate)    Goal: Safety  Outcome: Ongoing (interventions implemented as appropriate)

## 2018-06-14 NOTE — PLAN OF CARE
Problem: Patient Care Overview  Goal: Plan of Care Review  Outcome: Ongoing (interventions implemented as appropriate)   06/14/18 6213   Coping/Psychosocial   Plan of Care Reviewed With patient;daughter;son   Plan of Care Review   Progress improving   OTHER   Outcome Summary Patient is still disoriented to time and situation, follows commands. Bed available at [place]. Up with assist X1 to BS. DTV. Reposition q 2 hours, no skin breakdown noted. Safety maintained, bed alarm set.     Goal: Individualization and Mutuality  Outcome: Ongoing (interventions implemented as appropriate)    Goal: Discharge Needs Assessment  Outcome: Ongoing (interventions implemented as appropriate)    Goal: Interprofessional Rounds/Family Conf  Outcome: Ongoing (interventions implemented as appropriate)      Problem: Fall Risk (Adult)  Goal: Absence of Fall  Outcome: Ongoing (interventions implemented as appropriate)      Problem: Skin Injury Risk (Adult)  Goal: Skin Health and Integrity  Outcome: Ongoing (interventions implemented as appropriate)      Problem: Confusion, Acute (Adult)  Goal: Cognitive/Functional Impairments Minimized  Outcome: Ongoing (interventions implemented as appropriate)    Goal: Safety  Outcome: Ongoing (interventions implemented as appropriate)

## 2018-06-14 NOTE — PROGRESS NOTES
[facility] Medicine Services  INPATIENT PROGRESS NOTE    Length of Stay: 4  Date of Admission: 6/10/2018  Primary Care Physician: Titus Rubalcava II    Subjective   Chief Complaint: f/u   HPI   He is significantly better  Anxious as per her family; requested medication        Review of Systems     All pertinent negatives and positives are as above. All other systems have been reviewed and are negative unless otherwise stated.     Objective    Temp:  [97.6 °F (36.4 °C)-98.6 °F (37 °C)] 98.3 °F (36.8 °C)  Heart Rate:  [] 86  Resp:  [19-20] 19  BP: (127-160)/(69-87) 132/83  Physical Exam  He has turned around completely.  He appears stronger and more conversant  Normal respiratory effort  nonlabored breathing  Appropriate affect  Warm dry skin   Follows command    Results Review:  I have reviewed the labs, radiology results, and diagnostic studies.    Laboratory Data:     Results from last 7 days  Lab Units 06/11/18  0544   WBC 10*3/mm3 8.95   HEMOGLOBIN g/dL 14.3   HEMATOCRIT % 39.8*   PLATELETS 10*3/mm3 337          Results from last 7 days  Lab Units 06/14/18  0844 06/13/18  0448 06/12/18  0519 06/11/18  0544   SODIUM mmol/L 135 129* 128* 127*   POTASSIUM mmol/L 4.0 4.1 3.9 4.3   CHLORIDE mmol/L 100 99 94* 94*   CO2 mmol/L 26.0 22.0* 23.0* 25.0   BUN mg/dL 22* 18 13 16   CREATININE mg/dL 1.15 1.10 0.97 0.93   CALCIUM mg/dL 8.6 7.8* 8.2* 8.5   BILIRUBIN mg/dL  --   --   --  0.5   ALK PHOS U/L  --   --   --  72   ALT (SGPT) U/L  --   --   --  26   AST (SGOT) U/L  --   --   --  21   GLUCOSE mg/dL 111* 85 80 105*       Culture Data:        Radiology Data:   Imaging Results (last 24 hours)     ** No results found for the last 24 hours. **          I have reviewed the patient current medications.     Assessment/Plan     Hospital Problem List     Unresponsiveness        Encephalopathy - ? Drug induced possible; Pyuria - possibly UTI; possibly metabolic due to hyponatremia  (resolved - s/p 2 doses of samsca)  Hx of depression  Hx of dementia (end stage alzheimer)  Hx of HTN - start on carvedilol;  Tachycardia - maintaining sinus    Plan  Pending bed offer; discussed with Monica - awaiting now for PASSR as of this afternoon  Added xanax prn  D/c carranza cath  Activities as tolerated        Richard Chisholm MD   06/14/18   10:52 AM

## 2018-06-15 VITALS
DIASTOLIC BLOOD PRESSURE: 88 MMHG | RESPIRATION RATE: 16 BRPM | HEART RATE: 76 BPM | HEIGHT: 72 IN | BODY MASS INDEX: 26.45 KG/M2 | OXYGEN SATURATION: 94 % | WEIGHT: 195.3 LBS | TEMPERATURE: 98.7 F | SYSTOLIC BLOOD PRESSURE: 170 MMHG

## 2018-06-15 PROBLEM — F02.80 ALZHEIMER'S DEMENTIA WITHOUT BEHAVIORAL DISTURBANCE (HCC): Status: ACTIVE | Noted: 2018-06-15

## 2018-06-15 PROBLEM — G30.9 ALZHEIMER'S DEMENTIA WITHOUT BEHAVIORAL DISTURBANCE (HCC): Status: ACTIVE | Noted: 2018-06-15

## 2018-06-15 PROBLEM — G93.40 ENCEPHALOPATHY: Status: ACTIVE | Noted: 2018-06-15

## 2018-06-15 PROBLEM — I10 HYPERTENSION: Status: ACTIVE | Noted: 2018-06-15

## 2018-06-15 PROCEDURE — P9612 CATHETERIZE FOR URINE SPEC: HCPCS

## 2018-06-15 PROCEDURE — 25010000002 HEPARIN (PORCINE) PER 1000 UNITS: Performed by: FAMILY MEDICINE

## 2018-06-15 PROCEDURE — 97116 GAIT TRAINING THERAPY: CPT

## 2018-06-15 RX ORDER — CARVEDILOL 3.12 MG/1
6.25 TABLET ORAL EVERY 12 HOURS SCHEDULED
Qty: 60 TABLET | Refills: 0 | Status: SHIPPED | OUTPATIENT
Start: 2018-06-15

## 2018-06-15 RX ORDER — ALPRAZOLAM 0.25 MG/1
0.25 TABLET ORAL 2 TIMES DAILY PRN
Qty: 5 TABLET | Refills: 0 | Status: SHIPPED | OUTPATIENT
Start: 2018-06-15 | End: 2018-06-24

## 2018-06-15 RX ADMIN — MEMANTINE HYDROCHLORIDE 10 MG: 5 TABLET, FILM COATED ORAL at 08:41

## 2018-06-15 RX ADMIN — FAMOTIDINE 40 MG: 20 TABLET, FILM COATED ORAL at 08:41

## 2018-06-15 RX ADMIN — ALPRAZOLAM 0.25 MG: 0.25 TABLET ORAL at 16:38

## 2018-06-15 RX ADMIN — CARVEDILOL 3.12 MG: 3.12 TABLET, FILM COATED ORAL at 08:42

## 2018-06-15 RX ADMIN — HEPARIN SODIUM 5000 UNITS: 5000 INJECTION INTRAVENOUS; SUBCUTANEOUS at 08:42

## 2018-06-15 NOTE — PLAN OF CARE
Problem: Patient Care Overview  Goal: Plan of Care Review  Outcome: Ongoing (interventions implemented as appropriate)   06/15/18 0500   Coping/Psychosocial   Plan of Care Reviewed With patient   Plan of Care Review   Progress no change   OTHER   Outcome Summary BP elevated, safety maintained, pt disoriented to time and situation, no neuro changes, straight cath X2, unable to void on own, DTV, bed alarm, will continue to monitor     Goal: Individualization and Mutuality  Outcome: Ongoing (interventions implemented as appropriate)    Goal: Discharge Needs Assessment  Outcome: Ongoing (interventions implemented as appropriate)    Goal: Interprofessional Rounds/Family Conf  Outcome: Ongoing (interventions implemented as appropriate)      Problem: Fall Risk (Adult)  Goal: Absence of Fall  Outcome: Ongoing (interventions implemented as appropriate)      Problem: Skin Injury Risk (Adult)  Goal: Skin Health and Integrity  Outcome: Ongoing (interventions implemented as appropriate)      Problem: Confusion, Acute (Adult)  Goal: Cognitive/Functional Impairments Minimized  Outcome: Ongoing (interventions implemented as appropriate)    Goal: Safety  Outcome: Ongoing (interventions implemented as appropriate)

## 2018-06-15 NOTE — PROGRESS NOTES
Continued Stay Note  [facility]     Patient Name: Cherelle Davis  MRN: [id number]  Today's Date: 6/15/2018    Admit Date: 6/10/2018          Discharge Plan     Row Name 06/15/18 1447       Plan    Plan [facility]    Patient/Family in Agreement with Plan yes    Final Discharge Disposition Code 03 - skilled nursing facility (SNF)    Final Note Ilene at [facility] has called back to notify SW that patient is approved to admit to [facility] today. SW notified patient's son, daughter, and Dr. Chisholm. Discharge summary, discharge med list, and a copy of scripts to be faxed to facility at [phone]. Patient's nurse will call report to [phone].           PONCHO NewellW

## 2018-06-15 NOTE — PROGRESS NOTES
Continued Stay Note   [place]     Patient Name: Cherelle Davis  MRN: [id number]  Today's Date: 6/15/2018    Admit Date: 6/10/2018          Discharge Plan     Row Name 06/15/18 1354       Plan    Plan [facility] - Pending PASSR    Patient/Family in Agreement with Plan yes    Plan Comments SW called and left a message for Ilene at [facility] to check status of PASSR. Will follow for return phone call re this.                 PONCHO NewellW

## 2018-06-15 NOTE — PLAN OF CARE
Problem: Patient Care Overview  Goal: Plan of Care Review  Outcome: Unable to achieve outcome(s) by discharge Date Met: 06/15/18   06/15/18 8420   Coping/Psychosocial   Plan of Care Reviewed With patient;daughter;son   Plan of Care Review   Progress improving   OTHER   Outcome Summary Patient to be discharged to [facility]     Goal: Individualization and Mutuality  Outcome: Outcome(s) achieved Date Met: 06/15/18    Goal: Discharge Needs Assessment  Outcome: Outcome(s) achieved Date Met: 06/15/18    Goal: Interprofessional Rounds/Family Conf  Outcome: Outcome(s) achieved Date Met: 06/15/18      Problem: Fall Risk (Adult)  Goal: Absence of Fall  Outcome: Outcome(s) achieved Date Met: 06/15/18      Problem: Skin Injury Risk (Adult)  Goal: Skin Health and Integrity  Outcome: Outcome(s) achieved Date Met: 06/15/18      Problem: Confusion, Acute (Adult)  Goal: Cognitive/Functional Impairments Minimized  Outcome: Outcome(s) achieved Date Met: 06/15/18    Goal: Safety  Outcome: Outcome(s) achieved Date Met: 06/15/18

## 2018-06-15 NOTE — PLAN OF CARE
Problem: Patient Care Overview  Goal: Plan of Care Review  Outcome: Ongoing (interventions implemented as appropriate)   06/15/18 8506   Coping/Psychosocial   Plan of Care Reviewed With patient   Plan of Care Review   Progress improving   OTHER   Outcome Summary pt more alert, trans sup-sit sba-cga, sit-stand cga-min of 2, pt amb 100 feet HHA x 2, 100 feet with rwx cga-min. Pt trans back to bed cga-min. Pt would benefit from SNF

## 2018-06-15 NOTE — DISCHARGE SUMMARY
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  He had CT scan of the head from outside hospital showing no acute findings.  Patient reportedly had some jerking movements but does not have a history of seizure.  Pertinent diagnostic studies includes hyponatremia otherwise the complete metabolic panel was reportedly acceptable on admission.    He was evaluated by Dr. Ayoub.  EEG read as unremarkable. Venlafaxine and risperidone were discontinued.   With time, he improved spontaneously.  Completely turned around and able to make conversation.  He is more awake and alert .      Family requested transfer to skilled nursing facility near them.  Arrangement made in bed is offered today.  Patient can be transferred to skilled nursing facility.  Stable condition     Procedures Performed:  none    Consults: Dr. Ayoub/Dr. Rajan    Pertinent Test Results:   Lab Results (last 72 hours)     Procedure Component Value Units Date/Time    Basic Metabolic Panel [[id number]]  (Abnormal) Collected:  06/14/18 0844    Specimen:  Blood Updated:  06/14/18 0951     Glucose 111 (H) mg/dL      BUN 22 (H) mg/dL      Creatinine 1.15  mg/dL      Sodium 135 mmol/L      Potassium 4.0 mmol/L      Chloride 100 mmol/L      CO2 26.0 mmol/L      Calcium 8.6 mg/dL      eGFR Non African Amer 60 (L) mL/min/1.73      BUN/Creatinine Ratio 19.1     Anion Gap 9.0 mmol/L     Narrative:       The MDRD GFR formula is only valid for adults with stable renal function between ages 18 and 70.    Vitamin B1, Whole Blood [[id number]] Collected:  06/11/18 1452    Specimen:  Blood Updated:  06/13/18 1808     Vitamin B1, Whole Blood 122.2 nmol/L      Comment: This test was developed and its performance characteristics  determined by [facility]. It has not been cleared or approved  by the Food and Drug Administration.       Narrative:       Performed at:   - [address]  [id number]  : Jose Guadalupe Akbar MD, Phone:  [phone]    Basic Metabolic Panel [[id number]]  (Abnormal) Collected:  06/13/18 0448    Specimen:  Blood Updated:  06/13/18 0536     Glucose 85 mg/dL      BUN 18 mg/dL      Creatinine 1.10 mg/dL      Sodium 129 (L) mmol/L      Potassium 4.1 mmol/L      Chloride 99 mmol/L      CO2 22.0 (L) mmol/L      Calcium 7.8 (L) mg/dL      eGFR Non African Amer 63 mL/min/1.73      BUN/Creatinine Ratio 16.4     Anion Gap 8.0 mmol/L     Narrative:       The MDRD GFR formula is only valid for adults with stable renal function between ages 18 and 70.        Imaging Results (all)     Procedure Component Value Units Date/Time    US Carotid Bilateral [[id number]] Collected:  06/14/18 1412     Updated:  06/14/18 1418    Narrative:       EXAMINATION:  US CAROTID BILATERAL-  6/11/2018 4:34 PM CDT     HISTORY: Unresponsive.       COMPARISON: None     FINDINGS:   Transverse and longitudinal grayscale and Doppler sonographic images of  the carotid arteries were obtained.     RIGHT:  Grayscale appearance: There is calcified plaque in the right carotid  bifurcation.     Common carotid artery: Peak systolic velocity of 121 cm/sec.  Normal  spectral waveform.     Internal carotid artery: Peak systolic velocity of 202 cm/sec. Normal  spectral waveform.     ICA/CCA ratio: 1.8     External carotid artery: Normal spectral waveform and velocities.     Vertebral artery: Normal directional flow and spectral waveform.     LEFT:  Grayscale appearance: There is plaque in the left carotid bifurcation.     Common carotid artery: Peak systolic velocity of 127 cm/sec. Normal  spectral waveform.     Internal carotid artery: Peak systolic velocity of 186 cm/sec. Normal  spectral waveform.     ICA/CCA ratio: 1.5     External carotid artery: Normal spectral waveform and velocities.     Vertebral artery: Normal directional flow and spectral waveform.       Impression:          1. Plaque in the carotid bifurcations bilaterally. Stenosis is in the  50-69% range bilaterally based on velocity measurements.  2. Antegrade vertebral artery flow bilaterally.        Consensus Panel Gray-Scale and Doppler US Criteria for Diagnosis of ICA  Stenosis:                                                                                                                Primary Parameters                                         Additional  Parameters     Degree of                                ICA PSV                Plaque  Estimate                 ICA/CCA PSV              ICA EDV  Stenosis (%)                          (cm/sec)                            (%)*                                     Ratio                       (cm/sec)  ________________________________________________________________________  _________________     Normal                                         <125                               None                                      <2.0                             <40     <50                                               <125                                <50                                       <2.0                             <40     50-69                            "             125-230                            >/=50                                    2.0-4.0                             >/=70 but less than near             >230                              >/=50                                      >4.0                            >100       occlusion     Near occlusion                        High, low, or                      Visible                                  Variable                      Variable                                                    undetectable     Total occlusion                        Undetectable           Visible,  no detectable                     N/A                             N/A                                                                                                 lumen               This report was finalized on 06/14/2018 14:15 by Dr. Daniel Carmichael MD.          Condition on Discharge:  Stable    Physical Exam on Discharge:  /88 (BP Location: Left arm, Patient Position: Lying)   Pulse 76   Temp 98.7 °F (37.1 °C) (Oral)   Resp 16   Ht 182.9 cm (72\")   Wt 88.6 kg (195 lb 4.8 oz)   SpO2 94%   BMI 26.49 kg/m²   Physical Exam  He has turned around completely.  He appears stronger and more conversant; alert and oriented x 3; Appropriate affect  Normal respiratory effort; nonlabored breathing;  CTA  S1 s2 rrr  Soft abd, NABS, no obvious HSM  Warm dry skin   Follows command       Discharge Disposition:  Skilled Nursing Facility (DC - External)    Discharge Medications:     Discharge Medications      New Medications      Instructions Start Date   ALPRAZolam 0.25 MG tablet  Commonly known as:  XANAX   0.25 mg, Oral, 2 Times Daily PRN      carvedilol 3.125 MG tablet  Commonly known as:  COREG   6.25 mg, Oral, Every 12 Hours Scheduled         Changes to Medications      Instructions Start Date   CloNIDine 0.1 MG tablet  Commonly known as:  CATAPRES  What changed:  Another medication with the same name was removed. Continue " taking this medication, and follow the directions you see here.   0.1 mg, Oral, Nightly         Continue These Medications      Instructions Start Date   acetaminophen 500 MG tablet  Commonly known as:  TYLENOL   500 mg, Oral, Every 4 Hours PRN      amLODIPine 5 MG tablet  Commonly known as:  NORVASC   5 mg, Oral, Daily      aspirin 81 MG EC tablet   81 mg, Oral, Daily      donepezil 10 MG tablet  Commonly known as:  ARICEPT   10 mg, Oral, Nightly      levothyroxine 50 MCG tablet  Commonly known as:  SYNTHROID, LEVOTHROID   50 mcg, Oral, Daily      MEMANTINE HCL PO   10 mg, Oral, Daily      omeprazole 20 MG capsule  Commonly known as:  priLOSEC   20 mg, Oral, Daily      tamsulosin 0.4 MG capsule 24 hr capsule  Commonly known as:  FLOMAX   1 capsule, Oral, Nightly         Stop These Medications    metoprolol tartrate 50 MG tablet  Commonly known as:  LOPRESSOR     oxybutynin 5 MG tablet  Commonly known as:  DITROPAN     risperiDONE 0.25 MG tablet  Commonly known as:  risperDAL     traZODone 100 MG tablet  Commonly known as:  DESYREL     venlafaxine 37.5 MG tablet  Commonly known as:  EFFEXOR            Discharge Diet:   Diet Instructions     Diet: Cardiac, Regular; Thin       Discharge Diet:   Cardiac  Regular       Fluid Consistency:  Thin              Activity at Discharge:   Activity Instructions     Activity as Tolerated             Follow-up Appointments: PCP through SNF  Test Results Pending at Discharge: None     Richard Chisholm MD  06/15/18  3:13 PM    Time: 25 mins    Please note that portions of this note may have been completed with a voice recognition program. Efforts were made to edit the dictations, but occasionally words are mistranscribed.

## 2018-06-15 NOTE — THERAPY TREATMENT NOTE
Acute Care - Physical Therapy Treatment Note  [facility]     Patient Name: Cherelle Davis  : 1929  MRN: [id number]  Today's Date: 6/15/2018  Onset of Illness/Injury or Date of Surgery: 18  Date of Referral to : 18  Referring Physician: Richard Chisholm MD    Admit Date: 6/10/2018    Visit Dx:    ICD-10-CM ICD-9-CM   1. Dysphagia, unspecified type R13.10 787.20   2. Impaired mobility and ADLs Z74.09 799.89   3. Decreased strength, endurance, and mobility R53.1 780.79    Z74.09 780.99     Patient Active Problem List   Diagnosis   • Unresponsiveness       Therapy Treatment          Rehabilitation Treatment Summary     Row Name 06/15/18 1337             Treatment Time/Intention    Discipline physical therapy assistant  -      Document Type therapy note (daily note)  -2      Subjective Information no complaints  -2      Existing Precautions/Restrictions fall  -2      Treatment Considerations/Comments [facility]  -2      Recorded by [] Luz Maria Wood, [facility] 06/15/18 1348  [2] Luz Maria Wood, [facility] 06/15/18 1353      Row Name 06/15/18 1337             Bed Mobility Assessment/Treatment    Supine-Sit [place] (Bed Mobility) minimum assist (75% patient effort);verbal cues  -      Sit-Supine [place] (Bed Mobility) contact guard;verbal cues  -      Recorded by [] Luz Maria Wood, [facility] 06/15/18 1353      Row Name 06/15/18 1337             Transfer Assessment/Treatment    Transfer Assessment/Treatment toilet transfer  -      Sit-Stand [place] (Transfers) minimum assist (75% patient effort);2 person assist;verbal cues  -2      Stand-Sit [place] (Transfers) contact guard;verbal cues  -2      [place] Level (Toilet Transfer) contact guard;minimum assist (75% patient effort);verbal cues  -      Assistive Device (Toilet Transfer) walker, front-wheeled  -      Recorded by [] Luz Maria Wood, [facility] 06/15/18 1404  [AH2] Luz Maria Wood, [facility] 06/15/18 1353      Row  Name 06/15/18 1337             Gait/Stairs Assessment/Training    [place] Level (Gait) contact guard;minimum assist (75% patient effort);2 person assist;verbal cues  -      Assistive Device (Gait) walker, front-wheeled  -      Distance in Feet (Gait) 100   100 x 2,100 feet HHA x 2, 100 feet with rwx  -AH      Recorded by [] Luz Maria Wood, [facility] 06/15/18 1353      Row Name 06/15/18 1339             Positioning and Restraints    Pre-Treatment Position in bed  -      Post Treatment Position bed  -AH      In Bed fowlers;call light within reach;encouraged to call for assist;exit alarm on;with family/caregiver  -      Recorded by [] Luz Maria Wood, [facility] 06/15/18 1353      Row Name 06/15/18 1332             Pain Scale: Numbers Pre/Post-Treatment    Pain Scale: Numbers, Pretreatment 0/10 - no pain  -      Recorded by [] Luz Maria Wood, [facility] 06/15/18 1352        User Key  (r) = Recorded By, (t) = Taken By, (c) = Cosigned By    Initials Name Effective Dates Discipline     Luz Maria BILL Israel, PTA 08/02/16 -  PT                       PT Rehab Goals     Row Name 06/15/18 8404 06/15/18 1300          Gait Training Goal 1 (PT)    Activity/Assistive Device (Gait Training Goal 1, PT) gait (walking locomotion);assistive device use;decrease fall risk;improve balance and speed;increase endurance/gait distance;increase energy conservation;walker, rolling  -MS (r) KH (t) MS (c) gait (walking locomotion);assistive device use;improve balance and speed;increase endurance/gait distance;increase energy conservation;walker, rolling  -MS (r) KH (t) MS (c)     [place] Level (Gait Training Goal 1, PT) conditional independence  -MS (r) KH (t) MS (c) conditional independence  -MS (r) KH (t) MS (c)     Distance (Gait Goal 1, PT) 250 feet without a rest break  -MS (r) KH (t) MS (c) 250 feet  -MS (r) KH (t) MS (c)     Time Frame (Gait Training Goal 1, PT) long term goal (LTG)  -MS (r) KH (t) MS (c) long term goal (LTG)  -MS (r) KH  (t) MS (c)     Progress/Outcome (Gait Training Goal 1, PT) goal revised this date  -MS (r) KH (t) MS (c) goal revised this date   due to pt progress  -MS       User Key  (r) = Recorded By, (t) = Taken By, (c) = Cosigned By    Initials Name Provider Type Discipline    MS Nikki Jaquez, PT DPT Physical Therapist PT    EDWIN Mercedes, PT Student PT Student PT          Physical Therapy Education     Title: PT OT SLP Therapies (Done)     Topic: Physical Therapy (Done)     Point: Mobility training (Done)    Learning Progress Summary     Learner Status Readiness Method Response Comment Documented by    Patient Done Acceptance E,TB VU rwx  06/15/18 1353     Done Acceptance E VU,NR Patient was educated on use and proper mechanicas of BUE and BLE for bed mobility and transfers. Patient verbalized understanding but was confused with demonstration of techniques.  06/14/18 1418    Family Done Acceptance E VU,NR Patient was educated on use and proper mechanicas of BUE and BLE for bed mobility and transfers. Patient verbalized understanding but was confused with demonstration of techniques.  06/14/18 1418          Point: Home exercise program (Done)    Learning Progress Summary     Learner Status Readiness Method Response Comment Documented by    Patient Done Acceptance E VU,NR Patient was educated on use and proper mechanicas of BUE and BLE for bed mobility and transfers. Patient verbalized understanding but was confused with demonstration of techniques.  06/14/18 1418    Family Done Acceptance E VU,NR Patient was educated on use and proper mechanicas of BUE and BLE for bed mobility and transfers. Patient verbalized understanding but was confused with demonstration of techniques.  06/14/18 1418          Point: Body mechanics (Done)    Learning Progress Summary     Learner Status Readiness Method Response Comment Documented by    Patient Done Acceptance E VU,NR Patient was educated on use and proper mechanicas of  BUE and BLE for bed mobility and transfers. Patient verbalized understanding but was confused with demonstration of techniques.  06/14/18 1418    Family Done Acceptance E VU,NR Patient was educated on use and proper mechanicas of BUE and BLE for bed mobility and transfers. Patient verbalized understanding but was confused with demonstration of techniques.  06/14/18 1418          Point: Precautions (Done)    Learning Progress Summary     Learner Status Readiness Method Response Comment Documented by    Patient Done Acceptance E VU,NR Patient was educated on use and proper mechanicas of BUE and BLE for bed mobility and transfers. Patient verbalized understanding but was confused with demonstration of techniques.  06/14/18 1418    Family Done Acceptance E VU,NR Patient was educated on use and proper mechanicas of BUE and BLE for bed mobility and transfers. Patient verbalized understanding but was confused with demonstration of techniques.  06/14/18 1418                      User Key     Initials Effective Dates Name Provider Type Discipline     08/02/16 -  Luz Maria Wood, PTA Physical Therapy Assistant PT     04/05/18 -  Gena Mercedes, PT Student PT Student PT                    PT Recommendation and Plan     Plan of Care Reviewed With: patient  Progress: improving  Outcome Summary: pt more alert, trans sup-sit sba-cga, sit-stand cga-min of 2, pt amb 100 feet HHA x 2, 100 feet with rwx cga-min. Pt trans back to bed cga-min. Pt would benefit from SNF          Outcome Measures     Row Name 06/14/18 1500 06/14/18 1306 06/14/18 1300       How much help from another person do you currently need...    Turning from your back to your side while in flat bed without using bedrails?  -- 3  -MS (r) KH (t) MS (c) --  -MS (r) KH (t) MS (c)    Moving from lying on back to sitting on the side of a flat bed without bedrails?  -- 3  -MS (r) KH (t) MS (c) --  -MS (r) KH (t) MS (c)    Moving to and from a bed to a chair  (including a wheelchair)?  -- 2  -MS (r) KH (t) MS (c) --  -MS (r) KH (t) MS (c)    Standing up from a chair using your arms (e.g., wheelchair, bedside chair)?  -- 3  -MS (r) KH (t) MS (c) --  -MS (r) KH (t) MS (c)    Climbing 3-5 steps with a railing?  -- 1  -MS (r) KH (t) MS (c) --  -MS (r) KH (t) MS (c)    To walk in hospital room?  -- 1  -MS (r) KH (t) MS (c) --  -MS (r) KH (t) MS (c)    AM-[facility] 6 Clicks Score  -- 13  -MS (r) KH (t) --  -MS (r) KH (t)       How much help from another is currently needed...    Putting on and taking off regular lower body clothing? 2  -TS  --  --    Bathing (including washing, rinsing, and drying) 2  -TS  --  --    Toileting (which includes using toilet bed pan or urinal) 3  -TS  --  --    Putting on and taking off regular upper body clothing 3  -TS  --  --    Taking care of personal grooming (such as brushing teeth) 3  -TS  --  --    Eating meals 4  -TS  --  --    Score 17  -TS  --  --       Functional Assessment    Outcome Measure Options AM-PAC 6 Clicks Daily Activity (OT)  -TS AM-[facility] 6 Clicks Basic Mobility (PT)  -MS (r) KH (t) MS (c) --  -MS (r) KH (t) MS (c)    Row Name 06/13/18 1000             How much help from another is currently needed...    Putting on and taking off regular lower body clothing? 3  -MK      Bathing (including washing, rinsing, and drying) 2  -MK      Toileting (which includes using toilet bed pan or urinal) 2  -MK      Putting on and taking off regular upper body clothing 3  -MK      Taking care of personal grooming (such as brushing teeth) 3  -MK      Eating meals 3  -MK      Score 16  -MK         Functional Assessment    Outcome Measure Options AM-PAC 6 Clicks Daily Activity (OT)  -MK        User Key  (r) = Recorded By, (t) = Taken By, (c) = Cosigned By    Initials Name Provider Type    MERNA Stephens/L Occupational Therapy Assistant    MS Nikki Jaquez, PT DPT Physical Therapist    DINESH Duncan, OT Occupational Therapist    KH  Gena Mercedes, PT Student PT Student           Time Calculation:         PT Charges     Row Name 06/15/18 1405 06/15/18 1404          Time Calculation    Start Time  -- 1337  -     Stop Time  -- 1400  -     Time Calculation (min)  -- 23 min  -     PT Received On  -- 06/15/18  -     PT Goal Re-Cert Due Date  -- 06/24/18  -        Timed Charges    65865 - Gait Training Minutes  23  -  --       User Key  (r) = Recorded By, (t) = Taken By, (c) = Cosigned By    Initials Name Provider Type     Luz Maria Wood PTA Physical Therapy Assistant        Therapy Suggested Charges     Code   Minutes Charges    94065 (CPT®) Hc Pt Neuromusc Re Education Ea 15 Min      80379 (CPT®) Hc Pt Ther Proc Ea 15 Min      63692 (CPT®) Hc Gait Training Ea 15 Min 23 2    85383 (CPT®) Hc Pt Therapeutic Act Ea 15 Min      43739 (CPT®) Hc Pt Manual Therapy Ea 15 Min      35190 (CPT®) Hc Pt Iontophoresis Ea 15 Min      78865 (CPT®) Hc Pt Elec Stim Ea-Per 15 Min      99981 (CPT®) Hc Pt Ultrasound Ea 15 Min      52436 (CPT®) Hc Pt Self Care/Mgmt/Train Ea 15 Min      Total  23 2        Therapy Charges for Today     Code Description Service Date Service Provider Modifiers Qty    [id number] HC GAIT TRAINING EA 15 MIN 6/15/2018 Luz Maria Wood PTA GP, KX 2          PT G-Codes  Outcome Measure Options: AM-PAC 6 Clicks Daily Activity (OT)  Score: 13  Functional Limitation: Mobility: Walking and moving around  Mobility: Walking and Moving Around Current Status (): At least 40 percent but less than 60 percent impaired, limited or restricted  Mobility: Walking and Moving Around Goal Status (): At least 20 percent but less than 40 percent impaired, limited or restricted    Luz Maria Wood PTA  6/15/2018

## 2018-06-16 NOTE — THERAPY DISCHARGE NOTE
Acute Care - Physical Therapy Discharge Summary  [facility]       Patient Name: Cherelle Davis  : 1929  MRN: [id number]    Today's Date: 2018  Onset of Illness/Injury or Date of Surgery: 18    Date of Referral to PT: 18  Referring Physician: Richard Chisholm MD      Admit Date: 6/10/2018      PT Recommendation and Plan    Visit Dx:    ICD-10-CM ICD-9-CM   1. Dysphagia, unspecified type R13.10 787.20   2. Impaired mobility and ADLs Z74.09 799.89   3. Decreased strength, endurance, and mobility R53.1 780.79    Z74.09 780.99             Outcome Measures     Row Name 18 1500 18 1306 18 1300       How much help from another person do you currently need...    Turning from your back to your side while in flat bed without using bedrails?  -- 3  -MS (r) KH (t) MS (c) --  -MS (r) KH (t) MS (c)    Moving from lying on back to sitting on the side of a flat bed without bedrails?  -- 3  -MS (r) KH (t) MS (c) --  -MS (r) KH (t) MS (c)    Moving to and from a bed to a chair (including a wheelchair)?  -- 2  -MS (r) KH (t) MS (c) --  -MS (r) KH (t) MS (c)    Standing up from a chair using your arms (e.g., wheelchair, bedside chair)?  -- 3  -MS (r) KH (t) MS (c) --  -MS (r) KH (t) MS (c)    Climbing 3-5 steps with a railing?  -- 1  -MS (r) KH (t) MS (c) --  -MS (r) KH (t) MS (c)    To walk in hospital room?  -- 1  -MS (r) KH (t) MS (c) --  -MS (r) KH (t) MS (c)    AM-PAC 6 Clicks Score  -- 13  -MS (r) KH (t) --  -MS (r) KH (t)       How much help from another is currently needed...    Putting on and taking off regular lower body clothing? 2  -TS  --  --    Bathing (including washing, rinsing, and drying) 2  -TS  --  --    Toileting (which includes using toilet bed pan or urinal) 3  -TS  --  --    Putting on and taking off regular upper body clothing 3  -TS  --  --    Taking care of personal grooming (such as brushing teeth) 3  -TS  --  --    Eating meals 4  -TS  --  --    Score 17   -TS  --  --       Functional Assessment    Outcome Measure Options AM-PAC 6 Clicks Daily Activity (OT)  -TS AM-PAC 6 Clicks Basic Mobility (PT)  -MS (r) KH (t) MS (c) --  -MS (r) KH (t) MS (c)    Row Name 06/13/18 1000             How much help from another is currently needed...    Putting on and taking off regular lower body clothing? 3  -MK      Bathing (including washing, rinsing, and drying) 2  -MK      Toileting (which includes using toilet bed pan or urinal) 2  -MK      Putting on and taking off regular upper body clothing 3  -MK      Taking care of personal grooming (such as brushing teeth) 3  -MK      Eating meals 3  -MK      Score 16  -MK         Functional Assessment    Outcome Measure Options AM-PAC 6 Clicks Daily Activity (OT)  -MK        User Key  (r) = Recorded By, (t) = Taken By, (c) = Cosigned By    Initials Name Provider Type     Judie Hogan, BAUMAN/L Occupational Therapy Assistant    MS Nikki aJquez, PT DPT Physical Therapist    DINESH Duncan, OT Occupational Therapist    EDWIN Mercedes, PT Student PT Student            Therapy Suggested Charges     Code   Minutes Charges    79033 (CPT®) Hc Pt Neuromusc Re Education Ea 15 Min      15351 (CPT®) Hc Pt Ther Proc Ea 15 Min      31393 (CPT®) Hc Gait Training Ea 15 Min 23 2    99593 (CPT®) Hc Pt Therapeutic Act Ea 15 Min      80647 (CPT®) Hc Pt Manual Therapy Ea 15 Min      54270 (CPT®) Hc Pt Iontophoresis Ea 15 Min      23091 (CPT®) Hc Pt Elec Stim Ea-Per 15 Min      86855 (CPT®) Hc Pt Ultrasound Ea 15 Min      40619 (CPT®) Hc Pt Self Care/Mgmt/Train Ea 15 Min      Total  23 2                PT Rehab Goals     Row Name 06/16/18 0808             Bed Mobility Goal 1 (PT)    Activity/Assistive Device (Bed Mobility Goal 1, PT) sit to supine;supine to sit  -MF      [place] Level/Cues Needed (Bed Mobility Goal 1, PT) independent  -MF      Time Frame (Bed Mobility Goal 1, PT) long term goal (LTG)  -MF      Barriers (Bed Mobility Goal 1,  PT) hearing and cognition  -MF      Progress/Outcomes (Bed Mobility Goal 1, PT) goal ongoing  -MF         Bed Mobility Goal 2 (PT)    Activity/Assistive Device (Bed Mobility Goal 2, PT) bridging;scooting;rolling to left;rolling to right  -MF      [place] Level/Cues Needed (Bed Mobility Goal 2, PT) independent  -MF      Time Frame (Bed Mobility Goal 2, PT) long term goal (LTG)  -MF      Barriers (Bed Mobility Goal 2, PT) hearing and cognition  -MF      Progress/Outcomes (Bed Mobility Goal 2, PT) goal not met  -MF         Transfer Goal 1 (PT)    Activity/Assistive Device (Transfer Goal 1, PT) sit-to-stand/stand-to-sit;bed-to-chair/chair-to-bed  -MF      [place] Level/Cues Needed (Transfer Goal 1, PT) contact guard assist  -MF      Time Frame (Transfer Goal 1, PT) long term goal (LTG)  -MF      Barriers (Transfers Goal 1, PT) hearing and cognition  -MF      Progress/Outcome (Transfer Goal 1, PT) goal not met  -MF         Gait Training Goal 1 (PT)    Activity/Assistive Device (Gait Training Goal 1, PT) gait (walking locomotion);assistive device use;decrease fall risk;improve balance and speed;increase endurance/gait distance;increase energy conservation;walker, rolling  -MF      [place] Level (Gait Training Goal 1, PT) conditional independence  -MF      Distance (Gait Goal 1, PT) 250 feet without a rest break  -MF      Time Frame (Gait Training Goal 1, PT) long term goal (LTG)  -MF      Progress/Outcome (Gait Training Goal 1, PT) goal not met  -MF        User Key  (r) = Recorded By, (t) = Taken By, (c) = Cosigned By    Initials Name Provider Type Discipline     Carito Rodriguez PTA Physical Therapy Assistant PT        Row Name 06/14/18 1306      06/16/18 0808            Balance Goal 1 (PT)     Activity/Assistive Device (Balance Goal 1, PT) walker, rolling;other (see comments)   weight shifting and multidirectional steps  -MS (r) KH (t) MS (c)       [place] Level/Cues Needed (Balance Goal 1, PT)  contact guard assist  -MS (r) KH (t) MS (c)       Time Frame (Balance Goal 1, PT) long term goal (LTG)  -MS (r) KH (t) MS (c)       Barriers (Balance Goal 1, PT) hearing and cognition  -MS (r) KH (t) MS (c)       Progress/Outcomes (Balance Goal 1, PT) goal ongoing  -MS (r) KH (t) MS (c) Goal not met  MF           PT Discharge Summary  Anticipated Discharge Disposition (PT): skilled nursing facility  Reason for Discharge: Discharge from facility  Outcomes Achieved: Unable to make functional progress toward goals at this time  Discharge Destination: SNF      Carito Rodriguez, PTA   6/16/2018

## 2018-06-18 NOTE — THERAPY DISCHARGE NOTE
Acute Care - Occupational Therapy Discharge Summary  [facility]     Patient Name: Cherelle Davis  : 1929  MRN: [id number]    Today's Date: 2018  Onset of Illness/Injury or Date of Surgery: 18    Date of Referral to OT: 18  Referring Physician: Richard Chisholm MD      Admit Date: 6/10/2018        OT Recommendation and Plan    Visit Dx:    ICD-10-CM ICD-9-CM   1. Dysphagia, unspecified type R13.10 787.20   2. Impaired mobility and ADLs Z74.09 799.89   3. Decreased strength, endurance, and mobility R53.1 780.79    Z74.09 780.99                     OT Rehab Goals     Row Name 18 0756             Transfer Goal 1 (OT)    Activity/Assistive Device (Transfer Goal 1, OT) toilet;shower chair   DME prn  -TS      [place] Level/Cues Needed (Transfer Goal 1, OT) minimum assist (75% or more patient effort)  -TS      Time Frame (Transfer Goal 1, OT) 10 days  -TS      Progress/Outcome (Transfer Goal 1, OT) goal not met  -TS         Dressing Goal 1 (OT)    Activity/Assistive Device (Dressing Goal 1, OT) lower body dressing   all LB dressing tasks including standing to don pants  -TS      [place]/Cues Needed (Dressing Goal 1, OT) minimum assist (75% or more patient effort)  -TS      Time Frame (Dressing Goal 1, OT) 10 days  -TS      Progress/Outcome (Dressing Goal 1, OT) goal not met  -TS         Grooming Goal 1 (OT)    Activity/Device (Grooming Goal 1, OT) oral care;wash face, hands  -TS      [place] (Grooming Goal 1, OT) minimum assist (75% or more patient effort)  -TS      Time Frame (Grooming Goal 1, OT) 10 days  -TS      Progress/Outcome (Grooming Goal 1, OT) goal not met  -TS        User Key  (r) = Recorded By, (t) = Taken By, (c) = Cosigned By    Initials Name Provider Type Discipline    TS MERNA Cannon/L Occupational Therapy Assistant OT              Therapy Suggested Charges     Code   Minutes Charges    35013 (CPT®) Hc Ot Neuromusc Re Education Ea 15 Min       02517 (CPT®) Hc Ot Ther Proc Ea 15 Min      27426 (CPT®) Hc Gait Training Ea 15 Min      02501 (CPT®) Hc Ot Therapeutic Act Ea 15 Min      27752 (CPT®) Hc Ot Manual Therapy Ea 15 Min      27274 (CPT®) Hc Ot Iontophoresis Ea 15 Min      73753 (CPT®) Hc Ot Elec Stim Ea-Per 15 Min      49778 (CPT®) Hc Ot Ultrasound Ea 15 Min      96121 (CPT®) Hc Ot Self Care/Mgmt/Train Ea 15 Min 40 3    Total  40 3              OT Discharge Summary  Reason for Discharge: Discharge from facility  Outcomes Achieved: Refer to plan of care for updates on goals achieved  Discharge Destination: [facility]      MADELINE Burnette  6/18/2018

## 2018-07-05 ENCOUNTER — LAB REQUISITION (OUTPATIENT)
Dept: LAB | Facility: HOSPITAL | Age: 83
End: 2018-07-05

## 2018-07-05 DIAGNOSIS — N39.0 URINARY TRACT INFECTION: ICD-10-CM

## 2018-07-05 LAB
BILIRUB UR QL STRIP: NEGATIVE
CLARITY UR: CLEAR
COLOR UR: YELLOW
GLUCOSE UR STRIP-MCNC: NEGATIVE MG/DL
HGB UR QL STRIP.AUTO: NEGATIVE
KETONES UR QL STRIP: NEGATIVE
LEUKOCYTE ESTERASE UR QL STRIP.AUTO: ABNORMAL
NITRITE UR QL STRIP: NEGATIVE
PH UR STRIP.AUTO: 6 [PH] (ref 5–9)
PROT UR QL STRIP: ABNORMAL
SP GR UR STRIP: 1.03 (ref 1–1.03)
UROBILINOGEN UR QL STRIP: ABNORMAL

## 2018-07-05 PROCEDURE — 87086 URINE CULTURE/COLONY COUNT: CPT | Performed by: FAMILY MEDICINE

## 2018-07-05 PROCEDURE — 81003 URINALYSIS AUTO W/O SCOPE: CPT | Performed by: FAMILY MEDICINE

## 2018-07-07 LAB — BACTERIA SPEC AEROBE CULT: NORMAL

## 2018-10-01 ENCOUNTER — APPOINTMENT (OUTPATIENT)
Dept: CT IMAGING | Facility: HOSPITAL | Age: 83
End: 2018-10-01

## 2018-10-01 ENCOUNTER — HOSPITAL ENCOUNTER (EMERGENCY)
Facility: HOSPITAL | Age: 83
Discharge: NURSING, REHAB OR LONG-TERM CARE | End: 2018-10-01
Attending: EMERGENCY MEDICINE | Admitting: EMERGENCY MEDICINE

## 2018-10-01 VITALS
HEART RATE: 73 BPM | OXYGEN SATURATION: 95 % | WEIGHT: 180.2 LBS | RESPIRATION RATE: 18 BRPM | HEIGHT: 72 IN | SYSTOLIC BLOOD PRESSURE: 170 MMHG | BODY MASS INDEX: 24.41 KG/M2 | TEMPERATURE: 97.7 F | DIASTOLIC BLOOD PRESSURE: 101 MMHG

## 2018-10-01 DIAGNOSIS — R10.9 ABDOMINAL PAIN, UNSPECIFIED ABDOMINAL LOCATION: Primary | ICD-10-CM

## 2018-10-01 LAB
ALBUMIN SERPL-MCNC: 3.4 G/DL (ref 3.4–4.8)
ALBUMIN/GLOB SERPL: 1.2 G/DL (ref 1.1–1.8)
ALP SERPL-CCNC: 62 U/L (ref 38–126)
ALT SERPL W P-5'-P-CCNC: 26 U/L (ref 21–72)
ANION GAP SERPL CALCULATED.3IONS-SCNC: 9 MMOL/L (ref 5–15)
AST SERPL-CCNC: 16 U/L (ref 17–59)
BASOPHILS # BLD AUTO: 0.03 10*3/MM3 (ref 0–0.2)
BASOPHILS NFR BLD AUTO: 0.3 % (ref 0–2)
BILIRUB SERPL-MCNC: 0.4 MG/DL (ref 0.2–1.3)
BILIRUB UR QL STRIP: NEGATIVE
BUN BLD-MCNC: 31 MG/DL (ref 7–21)
BUN/CREAT SERPL: 24.2 (ref 7–25)
CALCIUM SPEC-SCNC: 8.5 MG/DL (ref 8.4–10.2)
CHLORIDE SERPL-SCNC: 98 MMOL/L (ref 95–110)
CK MB SERPL-CCNC: 0.74 NG/ML (ref 0–5)
CK SERPL-CCNC: 26 U/L (ref 55–170)
CLARITY UR: CLEAR
CO2 SERPL-SCNC: 25 MMOL/L (ref 22–31)
COLOR UR: YELLOW
CREAT BLD-MCNC: 1.28 MG/DL (ref 0.7–1.3)
DEPRECATED RDW RBC AUTO: 50.1 FL (ref 35.1–43.9)
EOSINOPHIL # BLD AUTO: 0.76 10*3/MM3 (ref 0–0.7)
EOSINOPHIL NFR BLD AUTO: 7.9 % (ref 0–7)
ERYTHROCYTE [DISTWIDTH] IN BLOOD BY AUTOMATED COUNT: 15.1 % (ref 11.5–14.5)
GFR SERPL CREATININE-BSD FRML MDRD: 53 ML/MIN/1.73 (ref 42–98)
GLOBULIN UR ELPH-MCNC: 2.8 GM/DL (ref 2.3–3.5)
GLUCOSE BLD-MCNC: 92 MG/DL (ref 60–100)
GLUCOSE UR STRIP-MCNC: NEGATIVE MG/DL
HCT VFR BLD AUTO: 41 % (ref 39–49)
HGB BLD-MCNC: 14.3 G/DL (ref 13.7–17.3)
HGB UR QL STRIP.AUTO: NEGATIVE
HOLD SPECIMEN: NORMAL
HOLD SPECIMEN: NORMAL
IMM GRANULOCYTES # BLD: 0.04 10*3/MM3 (ref 0–0.02)
IMM GRANULOCYTES NFR BLD: 0.4 % (ref 0–0.5)
KETONES UR QL STRIP: NEGATIVE
LEUKOCYTE ESTERASE UR QL STRIP.AUTO: NEGATIVE
LIPASE SERPL-CCNC: 50 U/L (ref 23–300)
LYMPHOCYTES # BLD AUTO: 4.12 10*3/MM3 (ref 0.6–4.2)
LYMPHOCYTES NFR BLD AUTO: 42.9 % (ref 10–50)
MAGNESIUM SERPL-MCNC: 2.3 MG/DL (ref 1.6–2.3)
MCH RBC QN AUTO: 31.6 PG (ref 26.5–34)
MCHC RBC AUTO-ENTMCNC: 34.9 G/DL (ref 31.5–36.3)
MCV RBC AUTO: 90.7 FL (ref 80–98)
MONOCYTES # BLD AUTO: 1.23 10*3/MM3 (ref 0–0.9)
MONOCYTES NFR BLD AUTO: 12.8 % (ref 0–12)
NEUTROPHILS # BLD AUTO: 3.42 10*3/MM3 (ref 2–8.6)
NEUTROPHILS NFR BLD AUTO: 35.7 % (ref 37–80)
NITRITE UR QL STRIP: NEGATIVE
PH UR STRIP.AUTO: 7 [PH] (ref 5–9)
PLATELET # BLD AUTO: 255 10*3/MM3 (ref 150–450)
PMV BLD AUTO: 9 FL (ref 8–12)
POTASSIUM BLD-SCNC: 4.4 MMOL/L (ref 3.5–5.1)
PROT SERPL-MCNC: 6.2 G/DL (ref 6.3–8.6)
PROT UR QL STRIP: NEGATIVE
RBC # BLD AUTO: 4.52 10*6/MM3 (ref 4.37–5.74)
SODIUM BLD-SCNC: 132 MMOL/L (ref 137–145)
SP GR UR STRIP: 1.03 (ref 1–1.03)
TROPONIN I SERPL-MCNC: <0.012 NG/ML
UROBILINOGEN UR QL STRIP: ABNORMAL
WBC NRBC COR # BLD: 9.6 10*3/MM3 (ref 3.2–9.8)
WHOLE BLOOD HOLD SPECIMEN: NORMAL
WHOLE BLOOD HOLD SPECIMEN: NORMAL

## 2018-10-01 PROCEDURE — 80053 COMPREHEN METABOLIC PANEL: CPT | Performed by: EMERGENCY MEDICINE

## 2018-10-01 PROCEDURE — 82553 CREATINE MB FRACTION: CPT | Performed by: FAMILY MEDICINE

## 2018-10-01 PROCEDURE — 99284 EMERGENCY DEPT VISIT MOD MDM: CPT

## 2018-10-01 PROCEDURE — 25010000002 MORPHINE PER 10 MG: Performed by: FAMILY MEDICINE

## 2018-10-01 PROCEDURE — 83690 ASSAY OF LIPASE: CPT | Performed by: EMERGENCY MEDICINE

## 2018-10-01 PROCEDURE — 83735 ASSAY OF MAGNESIUM: CPT | Performed by: FAMILY MEDICINE

## 2018-10-01 PROCEDURE — 96376 TX/PRO/DX INJ SAME DRUG ADON: CPT

## 2018-10-01 PROCEDURE — 25010000002 IOPAMIDOL 61 % SOLUTION: Performed by: EMERGENCY MEDICINE

## 2018-10-01 PROCEDURE — 81003 URINALYSIS AUTO W/O SCOPE: CPT | Performed by: FAMILY MEDICINE

## 2018-10-01 PROCEDURE — 96375 TX/PRO/DX INJ NEW DRUG ADDON: CPT

## 2018-10-01 PROCEDURE — 25010000002 ONDANSETRON PER 1 MG: Performed by: EMERGENCY MEDICINE

## 2018-10-01 PROCEDURE — 25010000002 MORPHINE PER 10 MG: Performed by: EMERGENCY MEDICINE

## 2018-10-01 PROCEDURE — 96361 HYDRATE IV INFUSION ADD-ON: CPT

## 2018-10-01 PROCEDURE — 84484 ASSAY OF TROPONIN QUANT: CPT | Performed by: FAMILY MEDICINE

## 2018-10-01 PROCEDURE — 70450 CT HEAD/BRAIN W/O DYE: CPT

## 2018-10-01 PROCEDURE — 96374 THER/PROPH/DIAG INJ IV PUSH: CPT

## 2018-10-01 PROCEDURE — 85025 COMPLETE CBC W/AUTO DIFF WBC: CPT | Performed by: EMERGENCY MEDICINE

## 2018-10-01 PROCEDURE — 74177 CT ABD & PELVIS W/CONTRAST: CPT

## 2018-10-01 PROCEDURE — 82550 ASSAY OF CK (CPK): CPT | Performed by: FAMILY MEDICINE

## 2018-10-01 RX ORDER — ONDANSETRON 2 MG/ML
4 INJECTION INTRAMUSCULAR; INTRAVENOUS ONCE
Status: COMPLETED | OUTPATIENT
Start: 2018-10-01 | End: 2018-10-01

## 2018-10-01 RX ORDER — SODIUM CHLORIDE 9 MG/ML
125 INJECTION, SOLUTION INTRAVENOUS CONTINUOUS
Status: DISCONTINUED | OUTPATIENT
Start: 2018-10-01 | End: 2018-10-01 | Stop reason: HOSPADM

## 2018-10-01 RX ORDER — SODIUM CHLORIDE 0.9 % (FLUSH) 0.9 %
10 SYRINGE (ML) INJECTION AS NEEDED
Status: DISCONTINUED | OUTPATIENT
Start: 2018-10-01 | End: 2018-10-01 | Stop reason: HOSPADM

## 2018-10-01 RX ADMIN — SODIUM CHLORIDE 125 ML/HR: 900 INJECTION, SOLUTION INTRAVENOUS at 06:34

## 2018-10-01 RX ADMIN — SODIUM CHLORIDE 1000 ML: 900 INJECTION, SOLUTION INTRAVENOUS at 07:35

## 2018-10-01 RX ADMIN — Medication 10 ML: at 06:12

## 2018-10-01 RX ADMIN — ONDANSETRON 4 MG: 2 INJECTION INTRAMUSCULAR; INTRAVENOUS at 06:34

## 2018-10-01 RX ADMIN — MORPHINE SULFATE 4 MG: 4 INJECTION, SOLUTION INTRAMUSCULAR; INTRAVENOUS at 06:35

## 2018-10-01 RX ADMIN — MORPHINE SULFATE 4 MG: 4 INJECTION, SOLUTION INTRAMUSCULAR; INTRAVENOUS at 07:44

## 2018-10-01 RX ADMIN — IOPAMIDOL 94 ML: 612 INJECTION, SOLUTION INTRAVENOUS at 07:02

## 2018-10-01 NOTE — ED PROVIDER NOTES
Subjective   89-year-old white male arrives to the emergency department via EMS with chief complaint of abdominal pain.  Patient has dementia and he is unable to provide history..  The nursing home referred him for evaluation of abdominal pain.            Review of Systems   Unable to perform ROS: Dementia       Past Medical History:   Diagnosis Date   • Anxiety    • BPH (benign prostatic hyperplasia)    • Dementia    • Depression    • GERD (gastroesophageal reflux disease)    • Hypertension    • Renal disease    • TIA (transient ischemic attack)        No Known Allergies    Past Surgical History:   Procedure Laterality Date   • CATARACT EXTRACTION     • CHOLECYSTECTOMY         No family history on file.    Social History     Social History   • Marital status:      Social History Main Topics   • Smoking status: Never Smoker   • Smokeless tobacco: Never Used   • Alcohol use No   • Drug use: No     Other Topics Concern   • Not on file           Objective   Physical Exam   Constitutional:   Patient is intermittently writhing and moaning and seems to be in pain.   HENT:   Head: Normocephalic and atraumatic.   Nose: Nose normal.   Mouth/Throat: Oropharynx is clear and moist.   Eyes: Pupils are equal, round, and reactive to light. Conjunctivae are normal.   Neck: Normal range of motion. Neck supple.   Cardiovascular: Normal rate, regular rhythm, normal heart sounds and intact distal pulses.    Pulmonary/Chest: Effort normal and breath sounds normal.   Abdominal: Bowel sounds are normal.   There is a ventral abdominal hernia.  Abdomen is soft and diffusely tender.   Musculoskeletal: Normal range of motion.   Neurological: He is alert.   Patient's speech is incomprehensible.  He is confused.  He is moving all extremities.   Skin: Skin is warm and dry.   Nursing note and vitals reviewed.      Procedures           ED Course  ED Course as of Oct 01 1130   Mon Oct 01, 2018   0703 Patient signed out to Dr. Dsouza at change  of shift this morning.  [DR]      ED Course User Index  [DR] Nelson Rayo MD                  [facility]      Final diagnoses:   Abdominal pain, unspecified abdominal location            Anurag Dsouza MD  10/01/18 6983

## 2018-10-01 NOTE — ED NOTES
Spoke with Jaqueline Rodriguez RN from [place]. Per Jaqueline, pt is normally A&O x 4 & able to get up to the bathroom on his own. After receiving this info, Dr Dsouza decided to order a head CT & postpone d/c pending results      Marcial Saini RN  10/01/18 9275

## 2018-10-01 NOTE — ED NOTES
Patient incontinent of urine, changed linens, gown, pads, diaper,      Jennifer, Alejandra A  10/01/18 3647

## 2018-10-01 NOTE — ED NOTES
rport given to Jaqueline Rodriguez RN at [facility]  Pt checked for incontinence prior to leaving with ems      Marcial Saini RN  10/01/18 9627

## 2018-10-01 NOTE — ED NOTES
Pt cleaned up from incontinent void & repositioned for comfort     Marcial Saini, RN  10/01/18 2700

## 2018-10-28 ENCOUNTER — LAB REQUISITION (OUTPATIENT)
Dept: LAB | Facility: HOSPITAL | Age: 83
End: 2018-10-28

## 2018-10-28 DIAGNOSIS — N40.1 ENLARGED PROSTATE WITH LOWER URINARY TRACT SYMPTOMS (LUTS): ICD-10-CM

## 2018-10-28 DIAGNOSIS — N18.9 CHRONIC KIDNEY DISEASE: ICD-10-CM

## 2018-10-28 LAB
BACTERIA UR QL AUTO: ABNORMAL /HPF
BILIRUB UR QL STRIP: NEGATIVE
CLARITY UR: ABNORMAL
COLOR UR: YELLOW
GLUCOSE UR STRIP-MCNC: NEGATIVE MG/DL
HGB UR QL STRIP.AUTO: ABNORMAL
HYALINE CASTS UR QL AUTO: ABNORMAL /LPF
KETONES UR QL STRIP: NEGATIVE
LEUKOCYTE ESTERASE UR QL STRIP.AUTO: ABNORMAL
NITRITE UR QL STRIP: NEGATIVE
PH UR STRIP.AUTO: 7 [PH] (ref 5–9)
PROT UR QL STRIP: ABNORMAL
RBC # UR: ABNORMAL /HPF
REF LAB TEST METHOD: ABNORMAL
SP GR UR STRIP: 1.02 (ref 1–1.03)
SQUAMOUS #/AREA URNS HPF: ABNORMAL /HPF
UROBILINOGEN UR QL STRIP: ABNORMAL
WBC UR QL AUTO: ABNORMAL /HPF

## 2018-10-28 PROCEDURE — 87086 URINE CULTURE/COLONY COUNT: CPT | Performed by: FAMILY MEDICINE

## 2018-10-28 PROCEDURE — 81001 URINALYSIS AUTO W/SCOPE: CPT | Performed by: FAMILY MEDICINE

## 2018-10-29 ENCOUNTER — APPOINTMENT (OUTPATIENT)
Dept: GENERAL RADIOLOGY | Facility: HOSPITAL | Age: 83
End: 2018-10-29

## 2018-10-29 ENCOUNTER — APPOINTMENT (OUTPATIENT)
Dept: CT IMAGING | Facility: HOSPITAL | Age: 83
End: 2018-10-29

## 2018-10-29 ENCOUNTER — HOSPITAL ENCOUNTER (EMERGENCY)
Facility: HOSPITAL | Age: 83
Discharge: NURSING, REHAB OR LONG-TERM CARE | End: 2018-10-29
Attending: EMERGENCY MEDICINE | Admitting: EMERGENCY MEDICINE

## 2018-10-29 VITALS
TEMPERATURE: 97.6 F | DIASTOLIC BLOOD PRESSURE: 85 MMHG | BODY MASS INDEX: 27.39 KG/M2 | WEIGHT: 202.25 LBS | SYSTOLIC BLOOD PRESSURE: 171 MMHG | OXYGEN SATURATION: 93 % | HEART RATE: 73 BPM | HEIGHT: 72 IN | RESPIRATION RATE: 18 BRPM

## 2018-10-29 DIAGNOSIS — N39.0 ACUTE UTI: Primary | ICD-10-CM

## 2018-10-29 LAB
ALBUMIN SERPL-MCNC: 3.7 G/DL (ref 3.4–4.8)
ALBUMIN/GLOB SERPL: 1.2 G/DL (ref 1.1–1.8)
ALP SERPL-CCNC: 72 U/L (ref 38–126)
ALT SERPL W P-5'-P-CCNC: 21 U/L (ref 21–72)
ANION GAP SERPL CALCULATED.3IONS-SCNC: 9 MMOL/L (ref 5–15)
AST SERPL-CCNC: 19 U/L (ref 17–59)
BACTERIA UR QL AUTO: ABNORMAL /HPF
BASOPHILS # BLD AUTO: 0.02 10*3/MM3 (ref 0–0.2)
BASOPHILS NFR BLD AUTO: 0.2 % (ref 0–2)
BILIRUB SERPL-MCNC: 0.5 MG/DL (ref 0.2–1.3)
BILIRUB UR QL STRIP: NEGATIVE
BUN BLD-MCNC: 25 MG/DL (ref 7–21)
BUN/CREAT SERPL: 20.2 (ref 7–25)
CALCIUM SPEC-SCNC: 9 MG/DL (ref 8.4–10.2)
CHLORIDE SERPL-SCNC: 96 MMOL/L (ref 95–110)
CLARITY UR: ABNORMAL
CO2 SERPL-SCNC: 25 MMOL/L (ref 22–31)
COLOR UR: YELLOW
CREAT BLD-MCNC: 1.24 MG/DL (ref 0.7–1.3)
DEPRECATED RDW RBC AUTO: 48.7 FL (ref 35.1–43.9)
EOSINOPHIL # BLD AUTO: 0.24 10*3/MM3 (ref 0–0.7)
EOSINOPHIL NFR BLD AUTO: 2.6 % (ref 0–7)
ERYTHROCYTE [DISTWIDTH] IN BLOOD BY AUTOMATED COUNT: 14.6 % (ref 11.5–14.5)
GFR SERPL CREATININE-BSD FRML MDRD: 55 ML/MIN/1.73 (ref 42–98)
GLOBULIN UR ELPH-MCNC: 3.1 GM/DL (ref 2.3–3.5)
GLUCOSE BLD-MCNC: 117 MG/DL (ref 60–100)
GLUCOSE UR STRIP-MCNC: NEGATIVE MG/DL
HCT VFR BLD AUTO: 44.1 % (ref 39–49)
HGB BLD-MCNC: 15.4 G/DL (ref 13.7–17.3)
HGB UR QL STRIP.AUTO: ABNORMAL
HOLD SPECIMEN: NORMAL
HYALINE CASTS UR QL AUTO: ABNORMAL /LPF
IMM GRANULOCYTES # BLD: 0.04 10*3/MM3 (ref 0–0.02)
IMM GRANULOCYTES NFR BLD: 0.4 % (ref 0–0.5)
INR PPP: 2.19 (ref 0.8–1.2)
KETONES UR QL STRIP: NEGATIVE
LEUKOCYTE ESTERASE UR QL STRIP.AUTO: ABNORMAL
LYMPHOCYTES # BLD AUTO: 1.74 10*3/MM3 (ref 0.6–4.2)
LYMPHOCYTES NFR BLD AUTO: 18.6 % (ref 10–50)
MCH RBC QN AUTO: 31.8 PG (ref 26.5–34)
MCHC RBC AUTO-ENTMCNC: 34.9 G/DL (ref 31.5–36.3)
MCV RBC AUTO: 91.1 FL (ref 80–98)
MONOCYTES # BLD AUTO: 0.85 10*3/MM3 (ref 0–0.9)
MONOCYTES NFR BLD AUTO: 9.1 % (ref 0–12)
NEUTROPHILS # BLD AUTO: 6.46 10*3/MM3 (ref 2–8.6)
NEUTROPHILS NFR BLD AUTO: 69.1 % (ref 37–80)
NITRITE UR QL STRIP: NEGATIVE
NT-PROBNP SERPL-MCNC: 201 PG/ML (ref 0–1800)
PH UR STRIP.AUTO: 7 [PH] (ref 5–9)
PLATELET # BLD AUTO: 265 10*3/MM3 (ref 150–450)
PMV BLD AUTO: 8.7 FL (ref 8–12)
POTASSIUM BLD-SCNC: 4.4 MMOL/L (ref 3.5–5.1)
PROT SERPL-MCNC: 6.8 G/DL (ref 6.3–8.6)
PROT UR QL STRIP: NEGATIVE
PROTHROMBIN TIME: 23.4 SECONDS (ref 11.1–15.3)
RBC # BLD AUTO: 4.84 10*6/MM3 (ref 4.37–5.74)
RBC # UR: ABNORMAL /HPF
REF LAB TEST METHOD: ABNORMAL
SODIUM BLD-SCNC: 130 MMOL/L (ref 137–145)
SP GR UR STRIP: 1.02 (ref 1–1.03)
SQUAMOUS #/AREA URNS HPF: ABNORMAL /HPF
UROBILINOGEN UR QL STRIP: ABNORMAL
WBC NRBC COR # BLD: 9.35 10*3/MM3 (ref 3.2–9.8)
WBC UR QL AUTO: ABNORMAL /HPF

## 2018-10-29 PROCEDURE — 70450 CT HEAD/BRAIN W/O DYE: CPT

## 2018-10-29 PROCEDURE — 83880 ASSAY OF NATRIURETIC PEPTIDE: CPT | Performed by: EMERGENCY MEDICINE

## 2018-10-29 PROCEDURE — 85610 PROTHROMBIN TIME: CPT | Performed by: EMERGENCY MEDICINE

## 2018-10-29 PROCEDURE — 93010 ELECTROCARDIOGRAM REPORT: CPT | Performed by: INTERNAL MEDICINE

## 2018-10-29 PROCEDURE — 93005 ELECTROCARDIOGRAM TRACING: CPT | Performed by: EMERGENCY MEDICINE

## 2018-10-29 PROCEDURE — 96365 THER/PROPH/DIAG IV INF INIT: CPT

## 2018-10-29 PROCEDURE — 71045 X-RAY EXAM CHEST 1 VIEW: CPT

## 2018-10-29 PROCEDURE — 25010000002 CEFTRIAXONE PER 250 MG: Performed by: EMERGENCY MEDICINE

## 2018-10-29 PROCEDURE — 72170 X-RAY EXAM OF PELVIS: CPT

## 2018-10-29 PROCEDURE — 93005 ELECTROCARDIOGRAM TRACING: CPT

## 2018-10-29 PROCEDURE — 81001 URINALYSIS AUTO W/SCOPE: CPT | Performed by: EMERGENCY MEDICINE

## 2018-10-29 PROCEDURE — 51702 INSERT TEMP BLADDER CATH: CPT

## 2018-10-29 PROCEDURE — 80053 COMPREHEN METABOLIC PANEL: CPT | Performed by: EMERGENCY MEDICINE

## 2018-10-29 PROCEDURE — 85025 COMPLETE CBC W/AUTO DIFF WBC: CPT | Performed by: EMERGENCY MEDICINE

## 2018-10-29 PROCEDURE — 73080 X-RAY EXAM OF ELBOW: CPT

## 2018-10-29 PROCEDURE — 99284 EMERGENCY DEPT VISIT MOD MDM: CPT

## 2018-10-29 RX ORDER — CIPROFLOXACIN 500 MG/1
500 TABLET, FILM COATED ORAL 2 TIMES DAILY
Qty: 20 TABLET | Refills: 0 | OUTPATIENT
Start: 2018-10-29 | End: 2018-11-24

## 2018-10-29 RX ORDER — SODIUM CHLORIDE 0.9 % (FLUSH) 0.9 %
10 SYRINGE (ML) INJECTION AS NEEDED
Status: DISCONTINUED | OUTPATIENT
Start: 2018-10-29 | End: 2018-10-29 | Stop reason: HOSPADM

## 2018-10-29 RX ADMIN — CEFTRIAXONE SODIUM 1 G: 1 INJECTION, POWDER, FOR SOLUTION INTRAMUSCULAR; INTRAVENOUS at 15:49

## 2018-10-29 RX ADMIN — Medication 10 ML: at 15:51

## 2018-10-30 LAB — BACTERIA SPEC AEROBE CULT: NORMAL

## 2018-11-24 ENCOUNTER — APPOINTMENT (OUTPATIENT)
Dept: GENERAL RADIOLOGY | Facility: HOSPITAL | Age: 83
End: 2018-11-24

## 2018-11-24 ENCOUNTER — HOSPITAL ENCOUNTER (EMERGENCY)
Facility: HOSPITAL | Age: 83
Discharge: HOME | End: 2018-11-24
Attending: FAMILY MEDICINE | Admitting: FAMILY MEDICINE

## 2018-11-24 VITALS
BODY MASS INDEX: 27.38 KG/M2 | WEIGHT: 195.6 LBS | SYSTOLIC BLOOD PRESSURE: 161 MMHG | HEART RATE: 74 BPM | RESPIRATION RATE: 18 BRPM | DIASTOLIC BLOOD PRESSURE: 67 MMHG | TEMPERATURE: 97.7 F | OXYGEN SATURATION: 96 % | HEIGHT: 71 IN

## 2018-11-24 DIAGNOSIS — R55 SYNCOPE, UNSPECIFIED SYNCOPE TYPE: Primary | ICD-10-CM

## 2018-11-24 DIAGNOSIS — N39.0 URINARY TRACT INFECTION ASSOCIATED WITH INDWELLING URETHRAL CATHETER, SEQUELA: ICD-10-CM

## 2018-11-24 DIAGNOSIS — T83.511S URINARY TRACT INFECTION ASSOCIATED WITH INDWELLING URETHRAL CATHETER, SEQUELA: ICD-10-CM

## 2018-11-24 LAB
ALBUMIN SERPL-MCNC: 3.9 G/DL (ref 3.4–4.8)
ALBUMIN/GLOB SERPL: 1.4 G/DL (ref 1.1–1.8)
ALP SERPL-CCNC: 77 U/L (ref 38–126)
ALT SERPL W P-5'-P-CCNC: 16 U/L (ref 21–72)
ANION GAP SERPL CALCULATED.3IONS-SCNC: 12 MMOL/L (ref 5–15)
AST SERPL-CCNC: 27 U/L (ref 17–59)
BACTERIA UR QL AUTO: ABNORMAL /HPF
BASOPHILS # BLD AUTO: 0.03 10*3/MM3 (ref 0–0.2)
BASOPHILS NFR BLD AUTO: 0.4 % (ref 0–2)
BILIRUB SERPL-MCNC: 0.4 MG/DL (ref 0.2–1.3)
BILIRUB UR QL STRIP: NEGATIVE
BUN BLD-MCNC: 28 MG/DL (ref 7–21)
BUN/CREAT SERPL: 20.4 (ref 7–25)
CALCIUM SPEC-SCNC: 8.8 MG/DL (ref 8.4–10.2)
CHLORIDE SERPL-SCNC: 96 MMOL/L (ref 95–110)
CLARITY UR: ABNORMAL
CO2 SERPL-SCNC: 24 MMOL/L (ref 22–31)
COLOR UR: YELLOW
CREAT BLD-MCNC: 1.37 MG/DL (ref 0.7–1.3)
DEPRECATED RDW RBC AUTO: 46.6 FL (ref 35.1–43.9)
EOSINOPHIL # BLD AUTO: 1.02 10*3/MM3 (ref 0–0.7)
EOSINOPHIL NFR BLD AUTO: 13.2 % (ref 0–7)
ERYTHROCYTE [DISTWIDTH] IN BLOOD BY AUTOMATED COUNT: 14 % (ref 11.5–14.5)
GFR SERPL CREATININE-BSD FRML MDRD: 49 ML/MIN/1.73 (ref 42–98)
GLOBULIN UR ELPH-MCNC: 2.8 GM/DL (ref 2.3–3.5)
GLUCOSE BLD-MCNC: 138 MG/DL (ref 60–100)
GLUCOSE BLDC GLUCOMTR-MCNC: 131 MG/DL (ref 70–130)
GLUCOSE UR STRIP-MCNC: NEGATIVE MG/DL
HCT VFR BLD AUTO: 39.1 % (ref 39–49)
HGB BLD-MCNC: 13.9 G/DL (ref 13.7–17.3)
HGB UR QL STRIP.AUTO: ABNORMAL
HOLD SPECIMEN: NORMAL
HOLD SPECIMEN: NORMAL
HYALINE CASTS UR QL AUTO: ABNORMAL /LPF
IMM GRANULOCYTES # BLD: 0.04 10*3/MM3 (ref 0–0.02)
IMM GRANULOCYTES NFR BLD: 0.5 % (ref 0–0.5)
KETONES UR QL STRIP: NEGATIVE
LEUKOCYTE ESTERASE UR QL STRIP.AUTO: ABNORMAL
LYMPHOCYTES # BLD AUTO: 2.02 10*3/MM3 (ref 0.6–4.2)
LYMPHOCYTES NFR BLD AUTO: 26.1 % (ref 10–50)
MAGNESIUM SERPL-MCNC: 2.2 MG/DL (ref 1.6–2.3)
MCH RBC QN AUTO: 32.4 PG (ref 26.5–34)
MCHC RBC AUTO-ENTMCNC: 35.5 G/DL (ref 31.5–36.3)
MCV RBC AUTO: 91.1 FL (ref 80–98)
MONOCYTES # BLD AUTO: 0.88 10*3/MM3 (ref 0–0.9)
MONOCYTES NFR BLD AUTO: 11.4 % (ref 0–12)
NEUTROPHILS # BLD AUTO: 3.74 10*3/MM3 (ref 2–8.6)
NEUTROPHILS NFR BLD AUTO: 48.4 % (ref 37–80)
NITRITE UR QL STRIP: POSITIVE
PH UR STRIP.AUTO: 6 [PH] (ref 5–9)
PLATELET # BLD AUTO: 258 10*3/MM3 (ref 150–450)
PMV BLD AUTO: 9.2 FL (ref 8–12)
POTASSIUM BLD-SCNC: 4.4 MMOL/L (ref 3.5–5.1)
PROT SERPL-MCNC: 6.7 G/DL (ref 6.3–8.6)
PROT UR QL STRIP: ABNORMAL
RBC # BLD AUTO: 4.29 10*6/MM3 (ref 4.37–5.74)
RBC # UR: ABNORMAL /HPF
REF LAB TEST METHOD: ABNORMAL
SODIUM BLD-SCNC: 132 MMOL/L (ref 137–145)
SP GR UR STRIP: 1.03 (ref 1–1.03)
SQUAMOUS #/AREA URNS HPF: ABNORMAL /HPF
TROPONIN I SERPL-MCNC: <0.012 NG/ML
UROBILINOGEN UR QL STRIP: ABNORMAL
WBC NRBC COR # BLD: 7.73 10*3/MM3 (ref 3.2–9.8)
WBC UR QL AUTO: ABNORMAL /HPF
WHOLE BLOOD HOLD SPECIMEN: NORMAL
WHOLE BLOOD HOLD SPECIMEN: NORMAL

## 2018-11-24 PROCEDURE — 99284 EMERGENCY DEPT VISIT MOD MDM: CPT

## 2018-11-24 PROCEDURE — 87186 SC STD MICRODIL/AGAR DIL: CPT | Performed by: FAMILY MEDICINE

## 2018-11-24 PROCEDURE — 71045 X-RAY EXAM CHEST 1 VIEW: CPT

## 2018-11-24 PROCEDURE — 93010 ELECTROCARDIOGRAM REPORT: CPT | Performed by: INTERNAL MEDICINE

## 2018-11-24 PROCEDURE — 87086 URINE CULTURE/COLONY COUNT: CPT | Performed by: FAMILY MEDICINE

## 2018-11-24 PROCEDURE — 82962 GLUCOSE BLOOD TEST: CPT

## 2018-11-24 PROCEDURE — 80053 COMPREHEN METABOLIC PANEL: CPT | Performed by: FAMILY MEDICINE

## 2018-11-24 PROCEDURE — 87077 CULTURE AEROBIC IDENTIFY: CPT | Performed by: FAMILY MEDICINE

## 2018-11-24 PROCEDURE — P9612 CATHETERIZE FOR URINE SPEC: HCPCS

## 2018-11-24 PROCEDURE — 84484 ASSAY OF TROPONIN QUANT: CPT | Performed by: FAMILY MEDICINE

## 2018-11-24 PROCEDURE — 93005 ELECTROCARDIOGRAM TRACING: CPT | Performed by: FAMILY MEDICINE

## 2018-11-24 PROCEDURE — 81001 URINALYSIS AUTO W/SCOPE: CPT | Performed by: FAMILY MEDICINE

## 2018-11-24 PROCEDURE — 85025 COMPLETE CBC W/AUTO DIFF WBC: CPT | Performed by: FAMILY MEDICINE

## 2018-11-24 PROCEDURE — 83735 ASSAY OF MAGNESIUM: CPT | Performed by: FAMILY MEDICINE

## 2018-11-24 RX ORDER — SULFAMETHOXAZOLE AND TRIMETHOPRIM 800; 160 MG/1; MG/1
1 TABLET ORAL ONCE
Status: COMPLETED | OUTPATIENT
Start: 2018-11-24 | End: 2018-11-24

## 2018-11-24 RX ORDER — SODIUM CHLORIDE 0.9 % (FLUSH) 0.9 %
10 SYRINGE (ML) INJECTION AS NEEDED
Status: DISCONTINUED | OUTPATIENT
Start: 2018-11-24 | End: 2018-11-24 | Stop reason: HOSPADM

## 2018-11-24 RX ORDER — SULFAMETHOXAZOLE AND TRIMETHOPRIM 800; 160 MG/1; MG/1
1 TABLET ORAL 2 TIMES DAILY
Qty: 14 TABLET | Refills: 0 | Status: SHIPPED | OUTPATIENT
Start: 2018-11-24 | End: 2018-12-01

## 2018-11-24 RX ADMIN — SULFAMETHOXAZOLE AND TRIMETHOPRIM 160 MG: 800; 160 TABLET ORAL at 21:38

## 2018-11-25 NOTE — ED PROVIDER NOTES
Subjective   89-year-old white male presents to the emergency department with a complaint of syncope.  His son presents at the bedside and states that the patient was on the toilet having a bowel movement and had a syncopal episode.  They got him up and he had an additional syncopal episode and they were unable to arouse him so they brought him to the emergency department.  His son states that he appears to be at baseline status at this time.    He has a history of BPH and had a catheter placed approximately 3 weeks ago.  His son states that he has seemed a little bit off for the last week or so.  He has suffered from frequent urinary tract infections in the past.  He has been on several rounds of antibiotics.        Syncope   Episode history:  Single  Most recent episode:  Today  Progression:  Resolved  Chronicity:  New  Context: bowel movement    Witnessed: yes    Worsened by:  Nothing      Review of Systems   Unable to perform ROS: Dementia   Cardiovascular: Positive for syncope.       Past Medical History:   Diagnosis Date   • Anxiety    • BPH (benign prostatic hyperplasia)    • Dementia    • Depression    • GERD (gastroesophageal reflux disease)    • Hypertension    • Renal disease    • TIA (transient ischemic attack)        No Known Allergies    Past Surgical History:   Procedure Laterality Date   • CATARACT EXTRACTION     • CHOLECYSTECTOMY         History reviewed. No pertinent family history.    Social History     Socioeconomic History   • Marital status:      Spouse name: Not on file   • Number of children: Not on file   • Years of education: Not on file   • Highest education level: Not on file   Tobacco Use   • Smoking status: Never Smoker   • Smokeless tobacco: Never Used   Substance and Sexual Activity   • Alcohol use: No   • Drug use: No           Objective   Physical Exam   Constitutional: He is oriented to person, place, and time. He appears well-developed and well-nourished. No distress.    HENT:   Head: Normocephalic and atraumatic.   Right Ear: External ear normal.   Left Ear: External ear normal.   Mouth/Throat: Oropharynx is clear and moist.   Eyes: Conjunctivae and EOM are normal. Pupils are equal, round, and reactive to light. No scleral icterus.   Neck: Normal range of motion. Neck supple. No JVD present. No tracheal deviation present. No thyromegaly present.   Cardiovascular: Normal rate and regular rhythm. Exam reveals no gallop and no friction rub.   No murmur heard.  Pulmonary/Chest: Effort normal and breath sounds normal. No stridor. No respiratory distress. He has no wheezes. He has no rales. He exhibits no tenderness.   Abdominal: Soft. Bowel sounds are normal. He exhibits no distension and no mass. There is no tenderness. There is no rebound and no guarding.   Musculoskeletal: He exhibits no edema, tenderness or deformity.   Lymphadenopathy:     He has no cervical adenopathy.   Neurological: He is alert and oriented to person, place, and time. No cranial nerve deficit. He exhibits normal muscle tone. Coordination normal.   Skin: Skin is warm and dry. No rash noted. He is not diaphoretic. No erythema. No pallor.   Psychiatric: He has a normal mood and affect.       ECG 12 Lead    Date/Time: 11/24/2018 8:00 PM  Performed by: Edy De Leon DO  Authorized by: Edy De Leon DO   Interpreted by physician  Comparison: not compared with previous ECG   Rhythm: sinus rhythm and A-V block  Rate: normal  BPM: 63  QRS axis: left  Conduction: conduction normal  ST Segments: ST segments normal  T Waves: T waves normal  Other: no other findings  Clinical impression: abnormal ECG                 ED Course  ED Course as of Nov 24 2129   Sat Nov 24, 2018 2125 Patient's posterior not available by me.  Physical exam was unremarkable.  Labs and chest x-ray were obtained and reviewed by me.  There were minor abnormalities in his labs not significant enough that it would explain his  syncope.  He did have a urinary tract infection noted in his urine which will be treated with Bactrim.  At this point we've is stable for discharge and will follow up with his primary care provider at the nursing home.  [CE]      ED Course User Index  [CE] Edy De Leon DO      Labs Reviewed   COMPREHENSIVE METABOLIC PANEL - Abnormal; Notable for the following components:       Result Value    Glucose 138 (*)     BUN 28 (*)     Creatinine 1.37 (*)     Sodium 132 (*)     ALT (SGPT) 16 (*)     All other components within normal limits    Narrative:     The MDRD GFR formula is only valid for adults with stable renal function between ages 18 and 70.   CBC WITH AUTO DIFFERENTIAL - Abnormal; Notable for the following components:    RBC 4.29 (*)     RDW-SD 46.6 (*)     Eosinophil % 13.2 (*)     Eosinophils, Absolute 1.02 (*)     Immature Grans, Absolute 0.04 (*)     All other components within normal limits   URINALYSIS W/ CULTURE IF INDICATED - Abnormal; Notable for the following components:    Appearance, UA Turbid (*)     Blood, UA Large (3+) (*)     Protein,  mg/dL (2+) (*)     Leuk Esterase, UA Moderate (2+) (*)     Nitrite, UA Positive (*)     All other components within normal limits   URINALYSIS, MICROSCOPIC ONLY - Abnormal; Notable for the following components:    RBC, UA Too Numerous to Count (*)     WBC, UA Too Numerous to Count (*)     Bacteria, UA 4+ (*)     All other components within normal limits   MAGNESIUM - Normal   TROPONIN (IN-HOUSE) - Normal   URINE CULTURE   RAINBOW DRAW    Narrative:     The following orders were created for panel order [place] Draw.  Procedure                               Abnormality         Status                     ---------                               -----------         ------                     Light Blue Top[[id number]]                                   Final result               Green Top (Gel)[[id number]]                                  Final result                Lavender Top[[id number]]                                     Final result               Gold Top - SST[[id number]]                                   Final result                 Please view results for these tests on the individual orders.   POCT GLUCOSE FINGERSTICK   CBC AND DIFFERENTIAL    Narrative:     The following orders were created for panel order CBC & Differential.  Procedure                               Abnormality         Status                     ---------                               -----------         ------                     CBC Auto Differential[[id number]]        Abnormal            Final result                 Please view results for these tests on the individual orders.   LIGHT BLUE TOP   GREEN TOP   LAVENDER TOP   GOLD TOP - SST     Xr Elbow 3+ View Right    Result Date: 10/29/2018  Narrative: EXAM DESCRIPTION: RIGHT ELBOW THREE VIEWS CLINICAL HISTORY: Status post fall. COMPARISON: None FINDINGS:  AP, lateral, and radial head projections of the right elbow are obtained. The alignment and mineralization are considered within limits of normal. The articular surfaces are smooth. No fracture, dislocation, or suspicious osseous lesion. There is tiny olecranon spur present likely associated with the triceps insertion. No joint effusion identified.     Impression: Negative for acute skeletal finding or joint effusion. Electronically signed by:  Garrick Guerra MD  10/29/2018 1:06 PM CDT Workstation: [id number]    Ct Head Without Contrast    Result Date: 10/29/2018  Narrative: . EXAMINATION:  CT SCAN OF THE HEAD WITHOUT INTRAVENOUS CONTRAST CLINICAL INFORMATION:  Sensation loss DOSE LENGTH PRODUCT: 1439 This exam was performed using radiation doses that are as low as reasonably achievable (ALARA). This exam was performed according to our departmental dose optimization program, which includes automated exposure control, adjustment of the mA and/or KV according to patient size and/or use of  iterative reconstruction technique. COMPARISON: None available. TECHNIQUE:  Axial images from skull base to vertex.  FINDINGS: There is mild diffuse parenchymal volume loss. Foci of decreased attenuation are seen in the periventricular white matter, likely due to microvascular ischemia. There is no evidence of intracranial hemorrhage, parenchymal mass, midline shift, or focal mass effect. There is no hydrocephalus or effacement of the basilar cisterns. There is no extra-axial hemorrhage or collection identified. The mastoid air cells and visualized paranasal sinuses appear clear.       Impression: Age-related atrophy and microvascular ischemic changes in the periventricular white matter. No evidence of intracranial hemorrhage, mass effect or large acute infarct. Electronically signed by:  Jose Guadalupe Burrows MD  10/29/2018 1:31 PM CDT Workstation: [id number]    Xr Chest 1 View    Result Date: 11/24/2018  Narrative: EXAM:         Radiograph(s), Chest VIEWS:   Frontal  ; 1     DATE/TIME:  11/24/2018 8:18 PM CST            INDICATION:   syncope  COMPARISON:  CXR: 10/29/18         FINDINGS:         - lines/tubes:    none   - cardiac:         size within normal limits       - mediastinum: contour within normal limits       - lungs:         no focal air space process, pulmonary interstitial edema, nodule(s)/mass           - pleura:         no evidence of  fluid                - osseous:         unremarkable for age                - misc.:        Impression: CONCLUSION:    1. No evidence of an active cardiopulmonary process.                                                  Electronically signed by:  EUGENIO Ramirez MD  11/24/2018 8:19 PM CST Workstation: [id number]    Xr Chest 1 View    Result Date: 10/29/2018  Narrative: [facility] Patient Name: [facility] ORDERING: SEVEN STARK ATTENDING: SEVEN STARK REFERRING: SEVEN STARK ----------------------- PROCEDURE: Chest Single View TECHNIQUE:  Single AP view of the chest COMPARISON: None HISTORY: weakness FINDINGS:  Life-support devices: None. Lungs/pleura: No consolidation, pleural effusion, or pneumothorax. Heart, hilar and mediastinal structures: There is mild cardiac enlargement without findings of failure. There is vascular calcification involving the thoracic aorta. There also appears to be mildly ectatic and/or uncoiling configuration of the thoracic aorta accentuated by portable technique and mild rotation of the patient. Skeletal Structures: No acute findings. No free air beneath the diaphragm.     Impression: No acute pulmonary or pleural finding. Mild cardiac enlargement without findings of failure. Atherosclerotic vascular changes of the thoracic aorta, likely accentuated by portable technique and rotation. Electronically signed by:  Garrick Guerra MD  10/29/2018 1:09 PM CDT Workstation: [id number]    Xr Pelvis 1 Or 2 View    Result Date: 10/29/2018  Narrative: PROCEDURE: AP pelvis. INDICATION: Fell. COMPARISON: None. No acute fracture or acute osseous abnormality in the hips or pelvis on either side. Mild degenerative arthritic changes in the hips right greater than left. Femoral heads normal density with smooth contours. Multiple small anchors over the lower pelvic area consistent with prior hernia repair.     Impression: No fractures or acute osseous abnormalities. 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 [id number] Electronically signed by:  Titus Acuna MD  10/29/2018 1:07 PM CDT Workstation: [facility]      Final diagnoses:   Syncope, unspecified syncope type   Urinary tract infection associated with indwelling urethral catheter, sequela            Edy De Leon,   11/24/18 9424

## 2018-11-27 LAB — BACTERIA SPEC AEROBE CULT: ABNORMAL

## 2018-11-29 ENCOUNTER — HOSPITAL ENCOUNTER (EMERGENCY)
Facility: HOSPITAL | Age: 83
Discharge: NURSING, REHAB OR LONG-TERM CARE | End: 2018-11-29
Attending: EMERGENCY MEDICINE | Admitting: EMERGENCY MEDICINE

## 2018-11-29 ENCOUNTER — LAB REQUISITION (OUTPATIENT)
Dept: LAB | Facility: HOSPITAL | Age: 83
End: 2018-11-29

## 2018-11-29 ENCOUNTER — APPOINTMENT (OUTPATIENT)
Dept: CT IMAGING | Facility: HOSPITAL | Age: 83
End: 2018-11-29

## 2018-11-29 VITALS
BODY MASS INDEX: 27.41 KG/M2 | WEIGHT: 195.8 LBS | SYSTOLIC BLOOD PRESSURE: 110 MMHG | TEMPERATURE: 98.4 F | DIASTOLIC BLOOD PRESSURE: 56 MMHG | HEIGHT: 71 IN | RESPIRATION RATE: 16 BRPM | HEART RATE: 90 BPM | OXYGEN SATURATION: 95 %

## 2018-11-29 DIAGNOSIS — R31.0 GROSS HEMATURIA: ICD-10-CM

## 2018-11-29 DIAGNOSIS — T83.511D URINARY TRACT INFECTION ASSOCIATED WITH INDWELLING URETHRAL CATHETER, SUBSEQUENT ENCOUNTER: Primary | ICD-10-CM

## 2018-11-29 DIAGNOSIS — K57.93: ICD-10-CM

## 2018-11-29 DIAGNOSIS — N39.0 URINARY TRACT INFECTION ASSOCIATED WITH INDWELLING URETHRAL CATHETER, SUBSEQUENT ENCOUNTER: Primary | ICD-10-CM

## 2018-11-29 LAB
ALBUMIN SERPL-MCNC: 4.2 G/DL (ref 3.4–4.8)
ALBUMIN/GLOB SERPL: 1.6 G/DL (ref 1.1–1.8)
ALP SERPL-CCNC: 69 U/L (ref 38–126)
ALT SERPL W P-5'-P-CCNC: 18 U/L (ref 21–72)
ANION GAP SERPL CALCULATED.3IONS-SCNC: 14 MMOL/L (ref 5–15)
APTT PPP: 35.6 SECONDS (ref 20–40.3)
AST SERPL-CCNC: 24 U/L (ref 17–59)
BACTERIA UR QL AUTO: ABNORMAL /HPF
BASOPHILS # BLD AUTO: 0.01 10*3/MM3 (ref 0–0.2)
BASOPHILS # BLD AUTO: 0.04 10*3/MM3 (ref 0–0.2)
BASOPHILS NFR BLD AUTO: 0.1 % (ref 0–2)
BASOPHILS NFR BLD AUTO: 0.4 % (ref 0–2)
BILIRUB SERPL-MCNC: 0.5 MG/DL (ref 0.2–1.3)
BILIRUB UR QL STRIP: ABNORMAL
BUN BLD-MCNC: 32 MG/DL (ref 7–21)
BUN/CREAT SERPL: 20.1 (ref 7–25)
CALCIUM SPEC-SCNC: 8.8 MG/DL (ref 8.4–10.2)
CHLORIDE SERPL-SCNC: 93 MMOL/L (ref 95–110)
CLARITY UR: ABNORMAL
CO2 SERPL-SCNC: 24 MMOL/L (ref 22–31)
COLOR UR: ABNORMAL
CREAT BLD-MCNC: 1.59 MG/DL (ref 0.7–1.3)
DEPRECATED RDW RBC AUTO: 47.6 FL (ref 35.1–43.9)
DEPRECATED RDW RBC AUTO: 49.1 FL (ref 35.1–43.9)
EOSINOPHIL # BLD AUTO: 0.18 10*3/MM3 (ref 0–0.7)
EOSINOPHIL # BLD AUTO: 0.93 10*3/MM3 (ref 0–0.7)
EOSINOPHIL NFR BLD AUTO: 1.6 % (ref 0–7)
EOSINOPHIL NFR BLD AUTO: 9.3 % (ref 0–7)
ERYTHROCYTE [DISTWIDTH] IN BLOOD BY AUTOMATED COUNT: 14.1 % (ref 11.5–14.5)
ERYTHROCYTE [DISTWIDTH] IN BLOOD BY AUTOMATED COUNT: 14.5 % (ref 11.5–14.5)
GFR SERPL CREATININE-BSD FRML MDRD: 41 ML/MIN/1.73 (ref 42–98)
GLOBULIN UR ELPH-MCNC: 2.6 GM/DL (ref 2.3–3.5)
GLUCOSE BLD-MCNC: 104 MG/DL (ref 60–100)
GLUCOSE UR STRIP-MCNC: NEGATIVE MG/DL
HCT VFR BLD AUTO: 39.6 % (ref 39–49)
HCT VFR BLD AUTO: 40 % (ref 39–49)
HGB BLD-MCNC: 13.4 G/DL (ref 13.7–17.3)
HGB BLD-MCNC: 13.9 G/DL (ref 13.7–17.3)
HGB UR QL STRIP.AUTO: ABNORMAL
HOLD SPECIMEN: NORMAL
HYALINE CASTS UR QL AUTO: ABNORMAL /LPF
IMM GRANULOCYTES # BLD: 0.06 10*3/MM3 (ref 0–0.02)
IMM GRANULOCYTES # BLD: 0.07 10*3/MM3 (ref 0–0.02)
IMM GRANULOCYTES NFR BLD: 0.6 % (ref 0–0.5)
IMM GRANULOCYTES NFR BLD: 0.6 % (ref 0–0.5)
INR PPP: 1.93 (ref 0.8–1.2)
KETONES UR QL STRIP: ABNORMAL
LEUKOCYTE ESTERASE UR QL STRIP.AUTO: ABNORMAL
LYMPHOCYTES # BLD AUTO: 0.64 10*3/MM3 (ref 0.6–4.2)
LYMPHOCYTES # BLD AUTO: 2.91 10*3/MM3 (ref 0.6–4.2)
LYMPHOCYTES NFR BLD AUTO: 29.1 % (ref 10–50)
LYMPHOCYTES NFR BLD AUTO: 5.7 % (ref 10–50)
MCH RBC QN AUTO: 31.5 PG (ref 26.5–34)
MCH RBC QN AUTO: 31.9 PG (ref 26.5–34)
MCHC RBC AUTO-ENTMCNC: 33.8 G/DL (ref 31.5–36.3)
MCHC RBC AUTO-ENTMCNC: 34.8 G/DL (ref 31.5–36.3)
MCV RBC AUTO: 91.7 FL (ref 80–98)
MCV RBC AUTO: 93.2 FL (ref 80–98)
MONOCYTES # BLD AUTO: 0.2 10*3/MM3 (ref 0–0.9)
MONOCYTES # BLD AUTO: 0.95 10*3/MM3 (ref 0–0.9)
MONOCYTES NFR BLD AUTO: 1.8 % (ref 0–12)
MONOCYTES NFR BLD AUTO: 9.5 % (ref 0–12)
NEUTROPHILS # BLD AUTO: 10.11 10*3/MM3 (ref 2–8.6)
NEUTROPHILS # BLD AUTO: 5.12 10*3/MM3 (ref 2–8.6)
NEUTROPHILS NFR BLD AUTO: 51.1 % (ref 37–80)
NEUTROPHILS NFR BLD AUTO: 90.2 % (ref 37–80)
NITRITE UR QL STRIP: POSITIVE
PH UR STRIP.AUTO: 6 [PH] (ref 5–9)
PLATELET # BLD AUTO: 250 10*3/MM3 (ref 150–450)
PLATELET # BLD AUTO: 276 10*3/MM3 (ref 150–450)
PMV BLD AUTO: 9.4 FL (ref 8–12)
PMV BLD AUTO: 9.6 FL (ref 8–12)
POTASSIUM BLD-SCNC: 4.9 MMOL/L (ref 3.5–5.1)
PROT SERPL-MCNC: 6.8 G/DL (ref 6.3–8.6)
PROT UR QL STRIP: ABNORMAL
PROTHROMBIN TIME: 21.3 SECONDS (ref 11.1–15.3)
RBC # BLD AUTO: 4.25 10*6/MM3 (ref 4.37–5.74)
RBC # BLD AUTO: 4.36 10*6/MM3 (ref 4.37–5.74)
RBC # UR: ABNORMAL /HPF
REF LAB TEST METHOD: ABNORMAL
SODIUM BLD-SCNC: 131 MMOL/L (ref 137–145)
SP GR UR STRIP: 1.02 (ref 1–1.03)
SQUAMOUS #/AREA URNS HPF: ABNORMAL /HPF
UROBILINOGEN UR QL STRIP: ABNORMAL
WBC NRBC COR # BLD: 10.01 10*3/MM3 (ref 3.2–9.8)
WBC NRBC COR # BLD: 11.21 10*3/MM3 (ref 3.2–9.8)
WBC UR QL AUTO: ABNORMAL /HPF

## 2018-11-29 PROCEDURE — 96375 TX/PRO/DX INJ NEW DRUG ADDON: CPT

## 2018-11-29 PROCEDURE — 85025 COMPLETE CBC W/AUTO DIFF WBC: CPT | Performed by: EMERGENCY MEDICINE

## 2018-11-29 PROCEDURE — 25010000002 FENTANYL CITRATE (PF) 100 MCG/2ML SOLUTION: Performed by: EMERGENCY MEDICINE

## 2018-11-29 PROCEDURE — 96365 THER/PROPH/DIAG IV INF INIT: CPT

## 2018-11-29 PROCEDURE — 74176 CT ABD & PELVIS W/O CONTRAST: CPT

## 2018-11-29 PROCEDURE — 87077 CULTURE AEROBIC IDENTIFY: CPT | Performed by: EMERGENCY MEDICINE

## 2018-11-29 PROCEDURE — 87086 URINE CULTURE/COLONY COUNT: CPT | Performed by: EMERGENCY MEDICINE

## 2018-11-29 PROCEDURE — 85025 COMPLETE CBC W/AUTO DIFF WBC: CPT | Performed by: FAMILY MEDICINE

## 2018-11-29 PROCEDURE — 85730 THROMBOPLASTIN TIME PARTIAL: CPT | Performed by: EMERGENCY MEDICINE

## 2018-11-29 PROCEDURE — 99284 EMERGENCY DEPT VISIT MOD MDM: CPT

## 2018-11-29 PROCEDURE — 81001 URINALYSIS AUTO W/SCOPE: CPT | Performed by: EMERGENCY MEDICINE

## 2018-11-29 PROCEDURE — 25010000002 PIPERACILLIN SOD-TAZOBACTAM PER 1 G: Performed by: EMERGENCY MEDICINE

## 2018-11-29 PROCEDURE — 87186 SC STD MICRODIL/AGAR DIL: CPT | Performed by: EMERGENCY MEDICINE

## 2018-11-29 PROCEDURE — P9612 CATHETERIZE FOR URINE SPEC: HCPCS

## 2018-11-29 PROCEDURE — 80053 COMPREHEN METABOLIC PANEL: CPT | Performed by: EMERGENCY MEDICINE

## 2018-11-29 PROCEDURE — 85610 PROTHROMBIN TIME: CPT | Performed by: EMERGENCY MEDICINE

## 2018-11-29 RX ORDER — FENTANYL CITRATE 50 UG/ML
50 INJECTION, SOLUTION INTRAMUSCULAR; INTRAVENOUS ONCE
Status: COMPLETED | OUTPATIENT
Start: 2018-11-29 | End: 2018-11-29

## 2018-11-29 RX ORDER — SODIUM CHLORIDE 0.9 % (FLUSH) 0.9 %
10 SYRINGE (ML) INJECTION AS NEEDED
Status: DISCONTINUED | OUTPATIENT
Start: 2018-11-29 | End: 2018-11-30 | Stop reason: HOSPADM

## 2018-11-29 RX ADMIN — PIPERACILLIN SODIUM,TAZOBACTAM SODIUM 3.38 G: 3; .375 INJECTION, POWDER, FOR SOLUTION INTRAVENOUS at 20:36

## 2018-11-29 RX ADMIN — FENTANYL CITRATE 50 MCG: 50 INJECTION, SOLUTION INTRAMUSCULAR; INTRAVENOUS at 18:27

## 2018-11-30 ENCOUNTER — TELEPHONE (OUTPATIENT)
Dept: EMERGENCY DEPT | Facility: HOSPITAL | Age: 83
End: 2018-11-30

## 2018-12-01 ENCOUNTER — HOSPITAL ENCOUNTER (EMERGENCY)
Facility: HOSPITAL | Age: 83
Discharge: NURSING, REHAB OR LONG-TERM CARE | End: 2018-12-01
Attending: FAMILY MEDICINE | Admitting: FAMILY MEDICINE

## 2018-12-01 ENCOUNTER — APPOINTMENT (OUTPATIENT)
Dept: CT IMAGING | Facility: HOSPITAL | Age: 83
End: 2018-12-01

## 2018-12-01 VITALS
SYSTOLIC BLOOD PRESSURE: 146 MMHG | BODY MASS INDEX: 27.66 KG/M2 | RESPIRATION RATE: 18 BRPM | DIASTOLIC BLOOD PRESSURE: 77 MMHG | HEART RATE: 67 BPM | OXYGEN SATURATION: 98 % | TEMPERATURE: 98.2 F | HEIGHT: 71 IN | WEIGHT: 197.6 LBS

## 2018-12-01 DIAGNOSIS — N39.0 URINARY TRACT INFECTION WITH HEMATURIA, SITE UNSPECIFIED: Primary | ICD-10-CM

## 2018-12-01 DIAGNOSIS — R31.9 URINARY TRACT INFECTION WITH HEMATURIA, SITE UNSPECIFIED: Primary | ICD-10-CM

## 2018-12-01 LAB
ALBUMIN SERPL-MCNC: 4.1 G/DL (ref 3.4–4.8)
ALBUMIN/GLOB SERPL: 1.4 G/DL (ref 1.1–1.8)
ALP SERPL-CCNC: 61 U/L (ref 38–126)
ALT SERPL W P-5'-P-CCNC: 24 U/L (ref 21–72)
AMPHET+METHAMPHET UR QL: NEGATIVE
ANION GAP SERPL CALCULATED.3IONS-SCNC: 12 MMOL/L (ref 5–15)
AST SERPL-CCNC: 24 U/L (ref 17–59)
BACTERIA SPEC AEROBE CULT: ABNORMAL
BACTERIA UR QL AUTO: ABNORMAL /HPF
BARBITURATES UR QL SCN: NEGATIVE
BASOPHILS # BLD AUTO: 0.03 10*3/MM3 (ref 0–0.2)
BASOPHILS NFR BLD AUTO: 0.3 % (ref 0–2)
BENZODIAZ UR QL SCN: NEGATIVE
BILIRUB SERPL-MCNC: 0.5 MG/DL (ref 0.2–1.3)
BILIRUB UR QL STRIP: NEGATIVE
BUN BLD-MCNC: 30 MG/DL (ref 7–21)
BUN/CREAT SERPL: 21.7 (ref 7–25)
CALCIUM SPEC-SCNC: 8.9 MG/DL (ref 8.4–10.2)
CANNABINOIDS SERPL QL: NEGATIVE
CHLORIDE SERPL-SCNC: 98 MMOL/L (ref 95–110)
CK MB SERPL-CCNC: 0.69 NG/ML (ref 0–5)
CK SERPL-CCNC: 24 U/L (ref 55–170)
CLARITY UR: CLEAR
CO2 SERPL-SCNC: 23 MMOL/L (ref 22–31)
COCAINE UR QL: NEGATIVE
COLOR UR: YELLOW
CREAT BLD-MCNC: 1.38 MG/DL (ref 0.7–1.3)
DEPRECATED RDW RBC AUTO: 49.7 FL (ref 35.1–43.9)
EOSINOPHIL # BLD AUTO: 0.64 10*3/MM3 (ref 0–0.7)
EOSINOPHIL NFR BLD AUTO: 6.3 % (ref 0–7)
ERYTHROCYTE [DISTWIDTH] IN BLOOD BY AUTOMATED COUNT: 14.6 % (ref 11.5–14.5)
ETHANOL BLD-MCNC: <10 MG/DL (ref 0–10)
ETHANOL UR QL: <0.01 %
GFR SERPL CREATININE-BSD FRML MDRD: 49 ML/MIN/1.73 (ref 42–98)
GLOBULIN UR ELPH-MCNC: 2.9 GM/DL (ref 2.3–3.5)
GLUCOSE BLD-MCNC: 103 MG/DL (ref 60–100)
GLUCOSE BLDC GLUCOMTR-MCNC: 114 MG/DL (ref 70–130)
GLUCOSE UR STRIP-MCNC: NEGATIVE MG/DL
HCT VFR BLD AUTO: 39.4 % (ref 39–49)
HGB BLD-MCNC: 13.8 G/DL (ref 13.7–17.3)
HGB UR QL STRIP.AUTO: ABNORMAL
HYALINE CASTS UR QL AUTO: ABNORMAL /LPF
IMM GRANULOCYTES # BLD: 0.05 10*3/MM3 (ref 0–0.02)
IMM GRANULOCYTES NFR BLD: 0.5 % (ref 0–0.5)
KETONES UR QL STRIP: NEGATIVE
LEUKOCYTE ESTERASE UR QL STRIP.AUTO: ABNORMAL
LIPASE SERPL-CCNC: 29 U/L (ref 23–300)
LYMPHOCYTES # BLD AUTO: 3.39 10*3/MM3 (ref 0.6–4.2)
LYMPHOCYTES NFR BLD AUTO: 33.5 % (ref 10–50)
MAGNESIUM SERPL-MCNC: 2.2 MG/DL (ref 1.6–2.3)
MCH RBC QN AUTO: 32 PG (ref 26.5–34)
MCHC RBC AUTO-ENTMCNC: 35 G/DL (ref 31.5–36.3)
MCV RBC AUTO: 91.4 FL (ref 80–98)
METHADONE UR QL SCN: NEGATIVE
MONOCYTES # BLD AUTO: 1.13 10*3/MM3 (ref 0–0.9)
MONOCYTES NFR BLD AUTO: 11.2 % (ref 0–12)
NEUTROPHILS # BLD AUTO: 4.88 10*3/MM3 (ref 2–8.6)
NEUTROPHILS NFR BLD AUTO: 48.2 % (ref 37–80)
NITRITE UR QL STRIP: NEGATIVE
OPIATES UR QL: NEGATIVE
OXYCODONE UR QL SCN: NEGATIVE
PH UR STRIP.AUTO: 8 [PH] (ref 5–9)
PLATELET # BLD AUTO: 286 10*3/MM3 (ref 150–450)
PMV BLD AUTO: 9 FL (ref 8–12)
POTASSIUM BLD-SCNC: 4.4 MMOL/L (ref 3.5–5.1)
PROT SERPL-MCNC: 7 G/DL (ref 6.3–8.6)
PROT UR QL STRIP: ABNORMAL
RBC # BLD AUTO: 4.31 10*6/MM3 (ref 4.37–5.74)
RBC # UR: ABNORMAL /HPF
REF LAB TEST METHOD: ABNORMAL
SODIUM BLD-SCNC: 133 MMOL/L (ref 137–145)
SP GR UR STRIP: 1.02 (ref 1–1.03)
SQUAMOUS #/AREA URNS HPF: ABNORMAL /HPF
TROPONIN I SERPL-MCNC: <0.012 NG/ML
UROBILINOGEN UR QL STRIP: ABNORMAL
WBC NRBC COR # BLD: 10.12 10*3/MM3 (ref 3.2–9.8)
WBC UR QL AUTO: ABNORMAL /HPF
WHOLE BLOOD HOLD SPECIMEN: NORMAL

## 2018-12-01 PROCEDURE — 82553 CREATINE MB FRACTION: CPT | Performed by: FAMILY MEDICINE

## 2018-12-01 PROCEDURE — 81001 URINALYSIS AUTO W/SCOPE: CPT | Performed by: FAMILY MEDICINE

## 2018-12-01 PROCEDURE — 80307 DRUG TEST PRSMV CHEM ANLYZR: CPT | Performed by: FAMILY MEDICINE

## 2018-12-01 PROCEDURE — 99284 EMERGENCY DEPT VISIT MOD MDM: CPT

## 2018-12-01 PROCEDURE — 93010 ELECTROCARDIOGRAM REPORT: CPT | Performed by: INTERNAL MEDICINE

## 2018-12-01 PROCEDURE — 82962 GLUCOSE BLOOD TEST: CPT

## 2018-12-01 PROCEDURE — 83735 ASSAY OF MAGNESIUM: CPT | Performed by: FAMILY MEDICINE

## 2018-12-01 PROCEDURE — 83690 ASSAY OF LIPASE: CPT | Performed by: FAMILY MEDICINE

## 2018-12-01 PROCEDURE — 93005 ELECTROCARDIOGRAM TRACING: CPT

## 2018-12-01 PROCEDURE — 87086 URINE CULTURE/COLONY COUNT: CPT | Performed by: FAMILY MEDICINE

## 2018-12-01 PROCEDURE — 70450 CT HEAD/BRAIN W/O DYE: CPT

## 2018-12-01 PROCEDURE — 80053 COMPREHEN METABOLIC PANEL: CPT | Performed by: FAMILY MEDICINE

## 2018-12-01 PROCEDURE — 82550 ASSAY OF CK (CPK): CPT | Performed by: FAMILY MEDICINE

## 2018-12-01 PROCEDURE — 84484 ASSAY OF TROPONIN QUANT: CPT | Performed by: FAMILY MEDICINE

## 2018-12-01 PROCEDURE — 93005 ELECTROCARDIOGRAM TRACING: CPT | Performed by: FAMILY MEDICINE

## 2018-12-01 PROCEDURE — 85025 COMPLETE CBC W/AUTO DIFF WBC: CPT | Performed by: FAMILY MEDICINE

## 2018-12-01 RX ORDER — SODIUM CHLORIDE 9 MG/ML
INJECTION, SOLUTION INTRAVENOUS
Status: COMPLETED
Start: 2018-12-01 | End: 2018-12-01

## 2018-12-01 RX ADMIN — SODIUM CHLORIDE 500 ML: 9 INJECTION, SOLUTION INTRAVENOUS at 20:39

## 2018-12-02 NOTE — ED NOTES
Pt alert and following commands. Pt responds appropriately.      Annabella Grant, RN  12/01/18 1437

## 2018-12-02 NOTE — ED NOTES
"Pt incontinent of stools, dried blood noted around testes and catheter. Pt cleaned and changed. After changing pt, pt said \"thank you\" 3 times with eyes closed. Pt still not responding to verbal commands. Pt also lifted R arm and held for approx 10 seconds while changing him.      Annabella Grant, RN  12/01/18 1936       Annabella Grant RN  12/01/18 1943    "

## 2018-12-02 NOTE — ED NOTES
IV left in place that this RN inserted for IV abx at the NH.      Annabella Grant, RN  12/01/18 4697

## 2018-12-02 NOTE — ED NOTES
Pt presents to the ED from [facility] with reports of decreased responsiveness. Pt is alert to hard pain only (pen to fingernail). Pt's vital signs are stable, has a pulse, and is breathing. Pt is not responding verbally and is limp when moving extremities. EMS states that NH went to  pt's food tray around 1800 and noticed his situation. No further info was provided.      Annabella Grant, RN  12/01/18 1942

## 2018-12-02 NOTE — ED NOTES
"Pupils are pinpoint; pt was administered 2 of narcan by EMS for findings. EMS reports \"increased awareness of pain\" after administration.      Annabella Grant RN  12/01/18 1944    "

## 2018-12-02 NOTE — ED PROVIDER NOTES
Subjective   Pt presents from a NH with UTI x several weeks according to his son. He has been on several abx and is now an IV abx, according to son.         Altered Mental Status   Presenting symptoms: behavior changes, confusion and lethargy    Severity:  Moderate  Most recent episode:  Today  Episode history:  Single  Duration:  1 day  Timing:  Constant  Progression:  Unchanged  Chronicity:  New  Context: dementia and nursing home resident    Context: not head injury    Associated symptoms: hallucinations and weakness    Associated symptoms: no slurred speech        Review of Systems   Unable to perform ROS: Mental status change   Constitutional: Positive for activity change.   Neurological: Positive for weakness.   Psychiatric/Behavioral: Positive for confusion and hallucinations.       Past Medical History:   Diagnosis Date   • Anxiety    • BPH (benign prostatic hyperplasia)    • Dementia    • Depression    • GERD (gastroesophageal reflux disease)    • Hypertension    • Renal disease    • TIA (transient ischemic attack)        No Known Allergies    Past Surgical History:   Procedure Laterality Date   • CATARACT EXTRACTION     • CHOLECYSTECTOMY         History reviewed. No pertinent family history.    Social History     Socioeconomic History   • Marital status:      Spouse name: Not on file   • Number of children: Not on file   • Years of education: Not on file   • Highest education level: Not on file   Tobacco Use   • Smoking status: Never Smoker   • Smokeless tobacco: Never Used   Substance and Sexual Activity   • Alcohol use: No   • Drug use: No           Objective   Physical Exam   Constitutional: He appears well-developed and well-nourished.   HENT:   Head: Normocephalic and atraumatic.   Nose: Nose normal.   Mouth/Throat: Oropharynx is clear and moist.   Eyes: Conjunctivae and EOM are normal. Pupils are equal, round, and reactive to light. Right eye exhibits no discharge. Left eye exhibits no  discharge. No scleral icterus.   Neck: Normal range of motion. Neck supple. No tracheal deviation present.   Cardiovascular: Normal rate, regular rhythm and normal heart sounds.   No murmur heard.  Pulmonary/Chest: Effort normal and breath sounds normal. No stridor. No respiratory distress. He has no wheezes. He has no rales.   Abdominal: Soft. Bowel sounds are normal. He exhibits no distension and no mass. There is no tenderness. There is no rebound and no guarding.   Musculoskeletal: He exhibits no edema.   Skin: Skin is warm and dry. No rash noted. No erythema.   Psychiatric: His affect is blunt. He is withdrawn. Cognition and memory are impaired. He is noncommunicative.   Nursing note and vitals reviewed.      ECG 12 Lead    Date/Time: 12/1/2018 11:04 PM  Performed by: Anurag Dsouza MD  Authorized by: Anurag Dsouza MD   Interpreted by physician  Rhythm: sinus rhythm  Rate: normal  BPM: 62  ST Segments: ST segments normal                   ED Course  ED Course as of Dec 01 2336   Sat Dec 01, 2018   2335 Patient was initially noncommunicative, but is now singing songs and much more active.  Patient's son is with him in the emergency department and states that he has had a neurological decline over the past year and went from an assisted living center to the nursing home.  Patient is currently on IV Zosyn in the nursing home for urinary tract infection.  Urine cultures from 2 days ago were reviewed in the emergency department and they're sensitive for meropenem and Zosyn.  Patient's was given discharge paperwork and instructed to resume his previous nursing home medications which would include his Zosyn.  [CB]      ED Course User Index  [CB] Anurag Dsouza MD          Labs Reviewed   COMPREHENSIVE METABOLIC PANEL - Abnormal; Notable for the following components:       Result Value    Glucose 103 (*)     BUN 30 (*)     Creatinine 1.38 (*)     Sodium 133 (*)     All other components within  normal limits    Narrative:     The MDRD GFR formula is only valid for adults with stable renal function between ages 18 and 70.   URINALYSIS W/ CULTURE IF INDICATED - Abnormal; Notable for the following components:    Blood, UA Small (1+) (*)     Protein, UA Trace (*)     Leuk Esterase, UA Small (1+) (*)     All other components within normal limits   CK - Abnormal; Notable for the following components:    Creatine Kinase 24 (*)     All other components within normal limits   CBC WITH AUTO DIFFERENTIAL - Abnormal; Notable for the following components:    WBC 10.12 (*)     RBC 4.31 (*)     RDW 14.6 (*)     RDW-SD 49.7 (*)     Monocytes, Absolute 1.13 (*)     Immature Grans, Absolute 0.05 (*)     All other components within normal limits   URINALYSIS, MICROSCOPIC ONLY - Abnormal; Notable for the following components:    RBC, UA 13-20 (*)     WBC, UA 13-20 (*)     All other components within normal limits   LIPASE - Normal   URINE DRUG SCREEN - Normal    Narrative:     Negative Thresholds For Drugs Screened in Urine:     Amphetamines          500 ng/ml  Barbiturates          200 ng/ml  Benzodiazepines       200 ng/ml  Cocaine               150 ng/ml  Methadone             300 ng/mL  Opiates               300 ng/mL  Oxycodone             100 ng/mL  THC                   20 ng/mL    The normal value for all drugs tested is negative. This report includes final unconfirmed screening results.  A positive result by this assay can be, at your request, sent to the Reference Lab for confirmation by gas chromatography. Unconfirmed results must not be used for non-medical purposes, such as employment or legal testing. Clinical consideration should be applied to any drug of abuse test result, particularly when unconfirmed results are used.   MAGNESIUM - Normal   TROPONIN (IN-HOUSE) - Normal   CK MB - Normal   POCT GLUCOSE FINGERSTICK - Normal   URINE CULTURE   ETHANOL   CBC AND DIFFERENTIAL    Narrative:     The following orders  were created for panel order CBC & Differential.  Procedure                               Abnormality         Status                     ---------                               -----------         ------                     CBC Auto Differential[[id number]]        Abnormal            Final result                 Please view results for these tests on the individual orders.   EXTRA TUBES    Narrative:     The following orders were created for panel order Extra Tubes.  Procedure                               Abnormality         Status                     ---------                               -----------         ------                     Light Blue Top[[id number]]                                   Final result                 Please view results for these tests on the individual orders.   LIGHT BLUE TOP       CT Head Without Contrast   Final Result   No obvious acute intracranial abnormality. Recommend   follow-up as clinically warranted.      Electronically signed by:  Matt Washburn MD  12/1/2018 9:42 PM   CST Workstation: [id number]                    [facility]      Final diagnoses:   Urinary tract infection with hematuria, site unspecified            Anurag Dsouza MD  12/01/18 9585

## 2018-12-02 NOTE — ED NOTES
"When starting Iv on patient, pt has been singing \"oh say can you see\" national anthem, praying to the lord, calling out his son and daughter's name. Pt not responding to verbal commands or questions still and is still not opening his eyes. Son reports he has been singing for approx 15 mins before this RN entered the room.      Annabella Grant, RN  12/01/18 2046       Annabella Grant, RN  12/01/18 2048    "

## 2018-12-02 NOTE — ED NOTES
Son at bedside and states he has had 3 ER visits within the past week or so. Last one was for a UTI which is being treated with IV abx at the nursing home. Son states pt is normally alert and oriented and talkative.      Annabella Grant RN  12/01/18 1938

## 2018-12-03 LAB — BACTERIA SPEC AEROBE CULT: NORMAL

## 2018-12-21 ENCOUNTER — HOSPITAL ENCOUNTER (EMERGENCY)
Facility: HOSPITAL | Age: 83
Discharge: NURSING, REHAB OR LONG-TERM CARE | End: 2018-12-21
Attending: FAMILY MEDICINE | Admitting: FAMILY MEDICINE

## 2018-12-21 VITALS
HEIGHT: 71 IN | SYSTOLIC BLOOD PRESSURE: 135 MMHG | WEIGHT: 181 LBS | OXYGEN SATURATION: 98 % | DIASTOLIC BLOOD PRESSURE: 79 MMHG | TEMPERATURE: 98.1 F | RESPIRATION RATE: 18 BRPM | BODY MASS INDEX: 25.34 KG/M2 | HEART RATE: 75 BPM

## 2018-12-21 DIAGNOSIS — R33.9 URINARY RETENTION: ICD-10-CM

## 2018-12-21 DIAGNOSIS — Z97.8 FOLEY CATHETER IN PLACE: Primary | ICD-10-CM

## 2018-12-21 PROCEDURE — 99283 EMERGENCY DEPT VISIT LOW MDM: CPT

## 2018-12-21 PROCEDURE — 51702 INSERT TEMP BLADDER CATH: CPT

## 2018-12-21 NOTE — ED NOTES
Sheryl RN asked me to call ems and set up transport back to [facility]. I called nancy at [facility] ems and he said dementia doesn't meet criteria. I called flora at [facility] for him to see if pt could be picked up by them and he checked and called me back and said their suv wasn't there, they have 1 between the 3 [place] facilities. He also said only certain staff have their special license to transport patients. I called pt's son Rd and he will pick pt up in an hour.

## 2018-12-21 NOTE — ED PROVIDER NOTES
Subjective   Patient sent emergency Department from nursing home for Wen catheter placement.  According to nursing home staff, patient has removed his Wen catheter.  Patient does not have any complaints.        Male  Problem   Presenting symptoms: no dysuria    Relieved by:  Nothing  Worsened by:  Nothing  Ineffective treatments:  None tried  Associated symptoms: urinary retention    Associated symptoms: no abdominal pain, no diarrhea, no fever, no nausea, no urinary frequency and no vomiting    Risk factors: urinary catheter        Review of Systems   Constitutional: Negative for appetite change, chills, diaphoresis, fatigue and fever.   HENT: Negative for congestion, ear discharge, ear pain, nosebleeds, rhinorrhea, sinus pressure, sore throat and trouble swallowing.    Eyes: Negative for discharge and redness.   Respiratory: Negative for apnea, cough, chest tightness, shortness of breath and wheezing.    Cardiovascular: Negative for chest pain.   Gastrointestinal: Negative for abdominal pain, diarrhea, nausea and vomiting.   Endocrine: Negative for polyuria.   Genitourinary: Negative for dysuria, frequency and urgency.   Musculoskeletal: Negative for myalgias and neck pain.   Skin: Negative for color change and rash.   Allergic/Immunologic: Negative for immunocompromised state.   Neurological: Negative for dizziness, seizures, syncope, weakness, light-headedness and headaches.   Hematological: Negative for adenopathy. Does not bruise/bleed easily.   Psychiatric/Behavioral: Negative for behavioral problems and confusion.   All other systems reviewed and are negative.      Past Medical History:   Diagnosis Date   • Anxiety    • BPH (benign prostatic hyperplasia)    • Dementia    • Depression    • GERD (gastroesophageal reflux disease)    • Hypertension    • Renal disease    • TIA (transient ischemic attack)        No Known Allergies    Past Surgical History:   Procedure Laterality Date   • CATARACT EXTRACTION      • CHOLECYSTECTOMY         History reviewed. No pertinent family history.    Social History     Socioeconomic History   • Marital status:      Spouse name: Not on file   • Number of children: Not on file   • Years of education: Not on file   • Highest education level: Not on file   Tobacco Use   • Smoking status: Never Smoker   • Smokeless tobacco: Never Used   Substance and Sexual Activity   • Alcohol use: No   • Drug use: No   • Sexual activity: Defer           Objective   Physical Exam   Constitutional: He is oriented to person, place, and time. He appears well-developed and well-nourished.   HENT:   Head: Normocephalic and atraumatic.   Nose: Nose normal.   Mouth/Throat: Oropharynx is clear and moist.   Eyes: Conjunctivae and EOM are normal. Pupils are equal, round, and reactive to light. Right eye exhibits no discharge. Left eye exhibits no discharge. No scleral icterus.   Neck: Normal range of motion. Neck supple. No tracheal deviation present.   Cardiovascular: Normal rate, regular rhythm and normal heart sounds.   No murmur heard.  Pulmonary/Chest: Effort normal and breath sounds normal. No stridor. No respiratory distress. He has no wheezes. He has no rales.   Abdominal: Soft. Bowel sounds are normal. He exhibits no distension and no mass. There is no tenderness. There is no rebound and no guarding.   Musculoskeletal: He exhibits no edema.   Neurological: He is alert and oriented to person, place, and time. Coordination normal.   Skin: Skin is warm and dry. No rash noted. No erythema.   Psychiatric: He has a normal mood and affect. His behavior is normal. Thought content normal.   Nursing note and vitals reviewed.      Procedures           ED Course          Labs Reviewed - No data to display    No orders to display                 MDM      Final diagnoses:   Wen catheter in place   Urinary retention            Anurag Dsouza MD  12/21/18 9060

## 2018-12-22 NOTE — ED NOTES
Phone report given to staff at [facility] facility      Sheryl Kong RN  12/21/18 2073

## 2019-06-13 ENCOUNTER — LAB REQUISITION (OUTPATIENT)
Dept: LAB | Facility: HOSPITAL | Age: 84
End: 2019-06-13

## 2019-06-13 DIAGNOSIS — N39.0 URINARY TRACT INFECTION: ICD-10-CM

## 2019-06-13 LAB
BUN BLD-MCNC: 27 MG/DL (ref 8–23)
CREAT BLD-MCNC: 1.35 MG/DL (ref 0.76–1.27)
GENTAMICIN SERPL-MCNC: 2.1 MCG/ML (ref 0.5–2)
GFR SERPL CREATININE-BSD FRML MDRD: 50 ML/MIN/1.73

## 2019-06-13 PROCEDURE — 80170 ASSAY OF GENTAMICIN: CPT | Performed by: FAMILY MEDICINE

## 2019-06-13 PROCEDURE — 84520 ASSAY OF UREA NITROGEN: CPT | Performed by: FAMILY MEDICINE

## 2019-06-13 PROCEDURE — 82565 ASSAY OF CREATININE: CPT | Performed by: FAMILY MEDICINE

## 2019-09-22 ENCOUNTER — HOSPITAL ENCOUNTER (EMERGENCY)
Facility: HOSPITAL | Age: 84
Discharge: HOME | End: 2019-09-22
Attending: FAMILY MEDICINE | Admitting: FAMILY MEDICINE

## 2019-09-22 ENCOUNTER — APPOINTMENT (OUTPATIENT)
Dept: CT IMAGING | Facility: HOSPITAL | Age: 84
End: 2019-09-22

## 2019-09-22 VITALS
DIASTOLIC BLOOD PRESSURE: 65 MMHG | BODY MASS INDEX: 25.58 KG/M2 | HEART RATE: 70 BPM | WEIGHT: 182.7 LBS | SYSTOLIC BLOOD PRESSURE: 132 MMHG | TEMPERATURE: 98.2 F | HEIGHT: 71 IN | OXYGEN SATURATION: 94 % | RESPIRATION RATE: 18 BRPM

## 2019-09-22 DIAGNOSIS — S09.90XA INJURY OF HEAD, INITIAL ENCOUNTER: ICD-10-CM
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
## 2019-09-22 DIAGNOSIS — S01.01XA LACERATION OF OCCIPITAL REGION OF SCALP, INITIAL ENCOUNTER: ICD-10-CM

## 2019-09-22 PROCEDURE — 70450 CT HEAD/BRAIN W/O DYE: CPT

## 2019-09-22 PROCEDURE — 99284 EMERGENCY DEPT VISIT MOD MDM: CPT

## 2019-09-23 NOTE — ED NOTES
Per Dr Dsouza, remove Steri-strips, clean wound, and reapply new steri strips.     Barbara Kirk RN  09/22/19 7481

## 2019-09-23 NOTE — ED PROVIDER NOTES
Subjective   The patient's son is present in the room with him and states that patient had a mechanical fall at the nursing home when he got up to use the restroom. +h/o dementia        Fall   Mechanism of injury: fall    Injury location:  Head/neck  Head/neck injury location:  Head  Incident location:  Nursing home  Fall:     Fall occurred:  Walking    Height of fall:  GLF    Impact surface:  Unable to specify    Point of impact:  Unable to specify  Protective equipment: none    Suspicion of alcohol use: no    Suspicion of drug use: no    Prior to arrival data:     Bystander interventions:  None    Patient ambulatory at scene: yes      Blood loss:  Minimal    Responsiveness at scene:  Alert    Loss of consciousness: no      Airway interventions:  None    Breathing interventions:  None  Associated symptoms: no difficulty breathing, no loss of consciousness, no seizures and no vomiting    Head Injury   Associated symptoms: no difficulty breathing, no loss of consciousness, no seizures and no vomiting        Review of Systems   Unable to perform ROS: Dementia   Gastrointestinal: Negative for vomiting.   Neurological: Negative for seizures and loss of consciousness.       Past Medical History:   Diagnosis Date   • Anxiety    • BPH (benign prostatic hyperplasia)    • Dementia    • Depression    • GERD (gastroesophageal reflux disease)    • Hypertension    • Renal disease    • TIA (transient ischemic attack)        No Known Allergies    Past Surgical History:   Procedure Laterality Date   • CATARACT EXTRACTION     • CHOLECYSTECTOMY         History reviewed. No pertinent family history.    Social History     Socioeconomic History   • Marital status:      Spouse name: Not on file   • Number of children: Not on file   • Years of education: Not on file   • Highest education level: Not on file   Tobacco Use   • Smoking status: Never Smoker   • Smokeless tobacco: Never Used   Substance and Sexual Activity   • Alcohol  use: No   • Drug use: No   • Sexual activity: Defer           Objective   Physical Exam   Constitutional: He is oriented to person, place, and time. He appears well-developed and well-nourished.   HENT:   Head: Normocephalic. Head is with laceration.       Nose: Nose normal.   Mouth/Throat: Oropharynx is clear and moist.   The laceration is more of an abrasion and superficial.  The skin is approximated, no suturing or wound approximation with tissue adhesive is indicated..  We will clean the wound and dressed with Steri-Strips.   Eyes: Conjunctivae and EOM are normal. Pupils are equal, round, and reactive to light. Right eye exhibits no discharge. Left eye exhibits no discharge. No scleral icterus.   Neck: Normal range of motion. Neck supple. No tracheal deviation present.   Cardiovascular: Normal rate, regular rhythm and normal heart sounds.   No murmur heard.  Pulmonary/Chest: Effort normal and breath sounds normal. No stridor. No respiratory distress. He has no wheezes. He has no rales.   Abdominal: Soft. Bowel sounds are normal. He exhibits no distension and no mass. There is no tenderness. There is no rebound and no guarding.   Musculoskeletal: He exhibits no edema.   Neurological: He is alert and oriented to person, place, and time. Coordination normal.   Skin: Skin is warm and dry. No rash noted. No erythema.   Psychiatric: He has a normal mood and affect. His behavior is normal. Thought content normal.   Nursing note and vitals reviewed.      Procedures           ED Course          Labs Reviewed - No data to display    CT Head Without Contrast   Final Result   No obvious acute intracranial abnormality. Recommend   follow-up as clinically warranted.      Electronically signed by:  Matt Washburn MD  9/22/2019 9:26 PM   CDT Workstation: [id number]                    [facility]    Final diagnoses:   Fall, initial encounter   Injury of head, initial encounter   Laceration of occipital region of scalp, initial encounter               Anurag Dsouza MD  09/22/19 1068

## 2019-09-23 NOTE — ED NOTES
"Pt presents to ED via EMS from [facility] after a fall. Pt has a laceration on his forehead. Bleeding is controlled by steri strips placed by nursing home staff. Per NH staff, pt is at his baseline neuro state. This RN asked how pt fell, pt stated \"what fall? I didn't fall\".     Barbara Kirk, RN  09/22/19 1915       Barbara Kirk RN  09/22/19 1917    "